# Patient Record
Sex: FEMALE | Race: BLACK OR AFRICAN AMERICAN | Employment: FULL TIME | URBAN - METROPOLITAN AREA
[De-identification: names, ages, dates, MRNs, and addresses within clinical notes are randomized per-mention and may not be internally consistent; named-entity substitution may affect disease eponyms.]

---

## 2017-06-27 ENCOUNTER — TELEPHONE (OUTPATIENT)
Dept: INTERNAL MEDICINE CLINIC | Age: 42
End: 2017-06-27

## 2017-07-03 ENCOUNTER — TELEPHONE (OUTPATIENT)
Dept: INTERNAL MEDICINE CLINIC | Age: 42
End: 2017-07-03

## 2017-10-03 ENCOUNTER — TELEPHONE (OUTPATIENT)
Dept: FAMILY MEDICINE CLINIC | Age: 42
End: 2017-10-03

## 2017-10-10 ENCOUNTER — HOSPITAL ENCOUNTER (OUTPATIENT)
Dept: MAMMOGRAPHY | Age: 42
Discharge: HOME | End: 2017-10-10
Attending: INTERNAL MEDICINE | Admitting: INTERNAL MEDICINE

## 2017-10-10 DIAGNOSIS — Z12.31 ENCOUNTER FOR SCREENING MAMMOGRAM FOR BREAST CANCER: ICD-10-CM

## 2017-11-06 ENCOUNTER — OFFICE VISIT (OUTPATIENT)
Dept: INTERNAL MEDICINE CLINIC | Age: 42
End: 2017-11-06

## 2017-11-06 VITALS
TEMPERATURE: 98.4 F | OXYGEN SATURATION: 98 % | SYSTOLIC BLOOD PRESSURE: 122 MMHG | WEIGHT: 139.6 LBS | HEIGHT: 59 IN | HEART RATE: 107 BPM | BODY MASS INDEX: 28.14 KG/M2 | DIASTOLIC BLOOD PRESSURE: 76 MMHG | RESPIRATION RATE: 12 BRPM

## 2017-11-06 DIAGNOSIS — Z00.00 ANNUAL PHYSICAL EXAM: Primary | ICD-10-CM

## 2017-11-06 LAB
BILIRUBIN, POC: NORMAL
BLOOD URINE, POC: NORMAL
CLARITY, POC: NORMAL
COLOR, POC: NORMAL
GLUCOSE URINE, POC: NORMAL
KETONES, POC: NORMAL
LEUKOCYTE EST, POC: NORMAL
NITRITE, POC: NORMAL
PH, POC: 7
PROTEIN, POC: NORMAL
SPECIFIC GRAVITY, POC: 1.02
UROBILINOGEN, POC: 0.2

## 2017-11-06 PROCEDURE — 81002 URINALYSIS NONAUTO W/O SCOPE: CPT | Performed by: INTERNAL MEDICINE

## 2017-11-06 PROCEDURE — 99396 PREV VISIT EST AGE 40-64: CPT | Performed by: INTERNAL MEDICINE

## 2017-11-06 RX ORDER — LEVONORGESTREL AND ETHINYL ESTRADIOL 0.1-0.02MG
KIT ORAL
COMMUNITY
End: 2017-11-06 | Stop reason: ALTCHOICE

## 2017-11-06 RX ORDER — IBUPROFEN 800 MG/1
TABLET ORAL
COMMUNITY
Start: 2003-03-31 | End: 2017-11-06 | Stop reason: ALTCHOICE

## 2017-11-06 ASSESSMENT — ENCOUNTER SYMPTOMS
CHEST TIGHTNESS: 0
SINUS PRESSURE: 0
PHOTOPHOBIA: 0
EYE PAIN: 0
EYE DISCHARGE: 0
CHOKING: 0
BACK PAIN: 0
ABDOMINAL PAIN: 0
ANAL BLEEDING: 0
SHORTNESS OF BREATH: 0
EYE ITCHING: 0
COUGH: 0
FACIAL SWELLING: 0
RECTAL PAIN: 0
VOMITING: 0
SORE THROAT: 0
NAUSEA: 0
TROUBLE SWALLOWING: 0
EYE REDNESS: 0
CONSTIPATION: 0
APNEA: 0
DIARRHEA: 0
BLOOD IN STOOL: 0
RHINORRHEA: 0
WHEEZING: 0
VOICE CHANGE: 0
ABDOMINAL DISTENTION: 0

## 2017-11-06 ASSESSMENT — PATIENT HEALTH QUESTIONNAIRE - PHQ9
2. FEELING DOWN, DEPRESSED OR HOPELESS: 0
1. LITTLE INTEREST OR PLEASURE IN DOING THINGS: 0
SUM OF ALL RESPONSES TO PHQ QUESTIONS 1-9: 0
SUM OF ALL RESPONSES TO PHQ9 QUESTIONS 1 & 2: 0

## 2017-11-06 NOTE — PATIENT INSTRUCTIONS
-Fast 8-10 hours for labs  -Regular aerobic exercise      Patient Education        Well Visit, Ages 25 to 48: Care Instructions  Your Care Instructions  Physical exams can help you stay healthy. Your doctor has checked your overall health and may have suggested ways to take good care of yourself. He or she also may have recommended tests. At home, you can help prevent illness with healthy eating, regular exercise, and other steps. Follow-up care is a key part of your treatment and safety. Be sure to make and go to all appointments, and call your doctor if you are having problems. It's also a good idea to know your test results and keep a list of the medicines you take. How can you care for yourself at home? · Reach and stay at a healthy weight. This will lower your risk for many problems, such as obesity, diabetes, heart disease, and high blood pressure. · Get at least 30 minutes of physical activity on most days of the week. Walking is a good choice. You also may want to do other activities, such as running, swimming, cycling, or playing tennis or team sports. Discuss any changes in your exercise program with your doctor. · Do not smoke or allow others to smoke around you. If you need help quitting, talk to your doctor about stop-smoking programs and medicines. These can increase your chances of quitting for good. · Talk to your doctor about whether you have any risk factors for sexually transmitted infections (STIs). Having one sex partner (who does not have STIs and does not have sex with anyone else) is a good way to avoid these infections. · Use birth control if you do not want to have children at this time. Talk with your doctor about the choices available and what might be best for you. · Protect your skin from too much sun. When you're outdoors from 10 a.m. to 4 p.m., stay in the shade or cover up with clothing and a hat with a wide brim. Wear sunglasses that block UV rays.  Even when it's cloudy, put broad-spectrum sunscreen (SPF 30 or higher) on any exposed skin. · See a dentist one or two times a year for checkups and to have your teeth cleaned. · Wear a seat belt in the car. · Drink alcohol in moderation, if at all. That means no more than 2 drinks a day for men and 1 drink a day for women. Follow your doctor's advice about when to have certain tests. These tests can spot problems early. For everyone  · Cholesterol. Have the fat (cholesterol) in your blood tested after age 21. Your doctor will tell you how often to have this done based on your age, family history, or other things that can increase your risk for heart disease. · Blood pressure. Have your blood pressure checked during a routine doctor visit. Your doctor will tell you how often to check your blood pressure based on your age, your blood pressure results, and other factors. · Vision. Talk with your doctor about how often to have a glaucoma test.  · Diabetes. Ask your doctor whether you should have tests for diabetes. · Colon cancer. Have a test for colon cancer at age 48. You may have one of several tests. If you are younger than 48, you may need a test earlier if you have any risk factors. Risk factors include whether you already had a precancerous polyp removed from your colon or whether your parent, brother, sister, or child has had colon cancer. For women  · Breast exam and mammogram. Talk to your doctor about when you should have a clinical breast exam and a mammogram. Medical experts differ on whether and how often women under 50 should have these tests. Your doctor can help you decide what is right for you. · Pap test and pelvic exam. Begin Pap tests at age 24. A Pap test is the best way to find cervical cancer. The test often is part of a pelvic exam. Ask how often to have this test.  · Tests for sexually transmitted infections (STIs). Ask whether you should have tests for STIs.  You may be at risk if you have sex with more than one person, especially if your partners do not wear condoms. For men  · Tests for sexually transmitted infections (STIs). Ask whether you should have tests for STIs. You may be at risk if you have sex with more than one person, especially if you do not wear a condom. · Testicular cancer exam. Ask your doctor whether you should check your testicles regularly. · Prostate exam. Talk to your doctor about whether you should have a blood test (called a PSA test) for prostate cancer. Experts differ on whether and when men should have this test. Some experts suggest it if you are older than 39 and are -American or have a father or brother who got prostate cancer when he was younger than 72. When should you call for help? Watch closely for changes in your health, and be sure to contact your doctor if you have any problems or symptoms that concern you. Where can you learn more? Go to [email or web address] and sign in to your [employer] account. Enter P072 in the [employer] box to learn more about \"Well Visit, Ages 25 to 48: Care Instructions. \"     If you do not have an account, please click on the \"Sign Up Now\" link. Current as of: July 19, 2016  Content Version: 11.3  © 4796-9485 [employer]. Care instructions adapted under license by [facility] ([facility]). If you have questions about a medical condition or this instruction, always ask your healthcare professional. [place] [address] any warranty or liability for your use of this information.

## 2017-11-06 NOTE — PROGRESS NOTES
Lola House   YOB: 1975    Date of Visit:  11/6/2017    Chief Complaint   Patient presents with    Annual Exam     not fasting        HPI  Pt presents for annual physical exam. LMP 10/19/17. Last pap smear done in February of this year per patient by OB/Gyn. Mammogram done on 10/10/17. Wt Readings from Last 3 Encounters:   11/06/17 139 lb 9.6 oz (63.3 kg)   09/26/16 130 lb (59 kg)   09/13/16 135 lb (61.2 kg)       Review of Systems   Constitutional: Positive for unexpected weight change (weight gain   in the past year). Negative for activity change, appetite change, chills, diaphoresis, fatigue and fever. HENT: Negative for congestion, ear discharge, ear pain, facial swelling, hearing loss, mouth sores, nosebleeds, postnasal drip, rhinorrhea, sinus pressure, sneezing, sore throat, tinnitus, trouble swallowing and voice change. Eyes: Negative for photophobia, pain, discharge, redness, itching and visual disturbance. Respiratory: Negative for apnea, cough, choking, chest tightness, shortness of breath and wheezing. Cardiovascular: Negative for chest pain, palpitations and leg swelling. Gastrointestinal: Negative for abdominal distention, abdominal pain, anal bleeding, blood in stool, constipation, diarrhea, nausea, rectal pain and vomiting. Endocrine: Negative for cold intolerance and heat intolerance. Genitourinary: Negative for decreased urine volume, difficulty urinating, dysuria, flank pain, frequency, genital sores, hematuria, menstrual problem, pelvic pain, urgency, vaginal bleeding, vaginal discharge and vaginal pain. Musculoskeletal: Negative for arthralgias, back pain, gait problem, joint swelling, myalgias, neck pain and neck stiffness. Skin: Negative for rash and wound. Neurological: Negative for dizziness, tremors, seizures, syncope, facial asymmetry, speech difficulty, weakness, light-headedness, numbness and headaches.    Hematological: Negative for adenopathy. Does not bruise/bleed easily. Psychiatric/Behavioral: Negative for decreased concentration, dysphoric mood and sleep disturbance. The patient is not nervous/anxious. All other systems reviewed and are negative. Past Medical History:   Diagnosis Date    GERD (gastroesophageal reflux disease)        Past Surgical History:   Procedure Laterality Date     SECTION      10/17/2005   &    2010    WISDOM TOOTH EXTRACTION Bilateral college aged    x4       Outpatient Prescriptions Marked as Taking for the 17 encounter (Office Visit) with Jaylin Georges MD   Medication Sig Dispense Refill    Multiple Vitamins-Minerals (HAIR/SKIN/NAILS/BIOTIN PO) Take 2 tablets by mouth daily.  OCELLA 3-0.03 MG TABS Take 1 tablet by mouth daily. Allergies   Allergen Reactions    Amoxicillin Itching    Fluconazole Rash       Social History   Substance Use Topics    Smoking status: Never Smoker    Smokeless tobacco: Never Used    Alcohol use No       Family History   Problem Relation Age of Onset    High Blood Pressure Mother     High Blood Pressure Father     Cancer Maternal Grandmother      Colon    Heart Disease Maternal Grandfather      MI    Diabetes Maternal Aunt     Heart Disease Paternal Grandfather      in 66's    Diabetes Maternal Aunt     Stroke Neg Hx        Immunization History   Administered Date(s) Administered    Influenza Vaccine, unspecified formulation 10/01/2016, 10/08/2017    Influenza, High Dose 2015    Tdap (Boostrix, Adacel) 2011       Vitals:    17 1437   BP: 122/76   Site: Left Arm   Position: Sitting   Cuff Size: Medium Adult   Pulse: 107   Resp: 12   Temp: 98.4 °F (36.9 °C)   TempSrc: Oral   SpO2: 98%   Weight: 139 lb 9.6 oz (63.3 kg)   Height: 4' 11\" (1.499 m)     Body mass index is 28.2 kg/m². Physical Exam   Constitutional: She is oriented to person, place, and time. She appears well-developed and well-nourished. No distress. Middle aged BF   HENT:   Head: Normocephalic and atraumatic. Right Ear: Hearing, tympanic membrane and ear canal normal.   Left Ear: Hearing, tympanic membrane and ear canal normal.   Nose: Nose normal.   Mouth/Throat: Uvula is midline, oropharynx is clear and moist and mucous membranes are normal.   Eyes: Conjunctivae, EOM and lids are normal. Pupils are equal, round, and reactive to light. Neck: Neck supple. Carotid bruit is not present. No thyromegaly present. Cardiovascular: Normal rate, regular rhythm, S1 normal, S2 normal, normal heart sounds and intact distal pulses. Exam reveals no gallop and no friction rub. No murmur heard. Pulmonary/Chest: Effort normal and breath sounds normal. No respiratory distress. She has no wheezes. She has no rhonchi. She has no rales. Abdominal: Soft. Bowel sounds are normal. She exhibits no distension and no mass. There is no hepatosplenomegaly. There is no tenderness. There is no rebound. Genitourinary:   Genitourinary Comments: deferred   Musculoskeletal: Normal range of motion. She exhibits no edema. Right shoulder: She exhibits normal range of motion and no tenderness. Left shoulder: She exhibits normal range of motion and no tenderness. Right elbow: She exhibits no swelling. No tenderness found. Left elbow: She exhibits no swelling. No tenderness found. Right wrist: She exhibits no tenderness and no swelling. Left wrist: She exhibits no tenderness and no swelling. Right hip: She exhibits no tenderness. Left hip: She exhibits no tenderness. Right knee: She exhibits no swelling. No tenderness found. Left knee: She exhibits no swelling. No tenderness found. Right ankle: She exhibits no swelling. No tenderness. Left ankle: She exhibits no swelling. No tenderness. Cervical back: She exhibits normal range of motion, no tenderness and no spasm.         Thoracic

## 2018-01-29 ENCOUNTER — OFFICE VISIT (OUTPATIENT)
Dept: INTERNAL MEDICINE CLINIC | Age: 43
End: 2018-01-29

## 2018-01-29 VITALS
DIASTOLIC BLOOD PRESSURE: 80 MMHG | WEIGHT: 140.6 LBS | HEART RATE: 75 BPM | OXYGEN SATURATION: 99 % | RESPIRATION RATE: 14 BRPM | SYSTOLIC BLOOD PRESSURE: 120 MMHG | TEMPERATURE: 98.4 F | BODY MASS INDEX: 28.35 KG/M2 | HEIGHT: 59 IN

## 2018-01-29 DIAGNOSIS — M79.89 BILATERAL HAND SWELLING: Primary | ICD-10-CM

## 2018-01-29 PROCEDURE — G8427 DOCREV CUR MEDS BY ELIG CLIN: HCPCS | Performed by: INTERNAL MEDICINE

## 2018-01-29 PROCEDURE — 99213 OFFICE O/P EST LOW 20 MIN: CPT | Performed by: INTERNAL MEDICINE

## 2018-01-29 PROCEDURE — G8484 FLU IMMUNIZE NO ADMIN: HCPCS | Performed by: INTERNAL MEDICINE

## 2018-01-29 PROCEDURE — 1036F TOBACCO NON-USER: CPT | Performed by: INTERNAL MEDICINE

## 2018-01-29 PROCEDURE — G8419 CALC BMI OUT NRM PARAM NOF/U: HCPCS | Performed by: INTERNAL MEDICINE

## 2018-01-29 RX ORDER — HYDROCHLOROTHIAZIDE 12.5 MG/1
12.5 CAPSULE, GELATIN COATED ORAL DAILY PRN
Qty: 30 CAPSULE | Refills: 0 | Status: SHIPPED | OUTPATIENT
Start: 2018-01-29 | End: 2018-11-12 | Stop reason: ALTCHOICE

## 2018-02-12 ENCOUNTER — OFFICE VISIT (OUTPATIENT)
Dept: INTERNAL MEDICINE CLINIC | Age: 43
End: 2018-02-12

## 2018-02-12 VITALS
OXYGEN SATURATION: 96 % | WEIGHT: 141 LBS | SYSTOLIC BLOOD PRESSURE: 120 MMHG | DIASTOLIC BLOOD PRESSURE: 80 MMHG | HEIGHT: 59 IN | BODY MASS INDEX: 28.43 KG/M2 | HEART RATE: 66 BPM

## 2018-02-12 DIAGNOSIS — R63.5 WEIGHT GAIN: ICD-10-CM

## 2018-02-12 DIAGNOSIS — R20.0 LEFT ARM NUMBNESS: ICD-10-CM

## 2018-02-12 DIAGNOSIS — M79.89 BILATERAL HAND SWELLING: Primary | ICD-10-CM

## 2018-02-12 DIAGNOSIS — R20.0 NUMBNESS OF LEFT HAND: ICD-10-CM

## 2018-02-12 LAB
ANION GAP SERPL CALCULATED.3IONS-SCNC: 14 MMOL/L (ref 3–16)
BUN BLDV-MCNC: 14 MG/DL (ref 7–20)
CALCIUM SERPL-MCNC: 9.9 MG/DL (ref 8.3–10.6)
CHLORIDE BLD-SCNC: 105 MMOL/L (ref 99–110)
CO2: 26 MMOL/L (ref 21–32)
CREAT SERPL-MCNC: 0.7 MG/DL (ref 0.6–1.1)
GFR AFRICAN AMERICAN: >60
GFR NON-AFRICAN AMERICAN: >60
GLUCOSE BLD-MCNC: 83 MG/DL (ref 70–99)
POTASSIUM SERPL-SCNC: 4.3 MMOL/L (ref 3.5–5.1)
SODIUM BLD-SCNC: 145 MMOL/L (ref 136–145)
TSH SERPL DL<=0.05 MIU/L-ACNC: 1.46 UIU/ML (ref 0.27–4.2)
VITAMIN B-12: 769 PG/ML (ref 211–911)

## 2018-02-12 PROCEDURE — G8419 CALC BMI OUT NRM PARAM NOF/U: HCPCS | Performed by: INTERNAL MEDICINE

## 2018-02-12 PROCEDURE — G8427 DOCREV CUR MEDS BY ELIG CLIN: HCPCS | Performed by: INTERNAL MEDICINE

## 2018-02-12 PROCEDURE — G8484 FLU IMMUNIZE NO ADMIN: HCPCS | Performed by: INTERNAL MEDICINE

## 2018-02-12 PROCEDURE — 99213 OFFICE O/P EST LOW 20 MIN: CPT | Performed by: INTERNAL MEDICINE

## 2018-02-12 PROCEDURE — 1036F TOBACCO NON-USER: CPT | Performed by: INTERNAL MEDICINE

## 2018-02-15 PROBLEM — R20.0 NUMBNESS OF LEFT HAND: Status: ACTIVE | Noted: 2018-02-15

## 2018-02-15 PROBLEM — R20.0 LEFT ARM NUMBNESS: Status: ACTIVE | Noted: 2018-02-15

## 2018-02-15 PROBLEM — R63.5 WEIGHT GAIN: Status: ACTIVE | Noted: 2018-02-15

## 2018-02-15 PROBLEM — M79.89 BILATERAL HAND SWELLING: Status: ACTIVE | Noted: 2018-02-15

## 2018-02-15 ASSESSMENT — ENCOUNTER SYMPTOMS: COLOR CHANGE: 0

## 2018-02-19 ENCOUNTER — PATIENT MESSAGE (OUTPATIENT)
Dept: INTERNAL MEDICINE CLINIC | Age: 43
End: 2018-02-19

## 2018-02-24 DIAGNOSIS — M79.89 BILATERAL HAND SWELLING: ICD-10-CM

## 2018-02-26 NOTE — TELEPHONE ENCOUNTER
Last Office Visit: 2/12/2018  Future Office Visit: 3/5/2018  Last Filled: 1/29/2018 Hydrochlorothiazide 12.5 mg tab #30

## 2018-03-02 RX ORDER — HYDROCHLOROTHIAZIDE 12.5 MG/1
CAPSULE, GELATIN COATED ORAL
Qty: 30 CAPSULE | Refills: 0 | OUTPATIENT
Start: 2018-03-02

## 2018-11-12 ENCOUNTER — HOSPITAL ENCOUNTER (OUTPATIENT)
Dept: WOMENS IMAGING | Age: 43
Discharge: HOME | End: 2018-11-12
Payer: COMMERCIAL

## 2018-11-12 ENCOUNTER — OFFICE VISIT (OUTPATIENT)
Dept: INTERNAL MEDICINE CLINIC | Age: 43
End: 2018-11-12
Payer: COMMERCIAL

## 2018-11-12 VITALS
BODY MASS INDEX: 28.67 KG/M2 | SYSTOLIC BLOOD PRESSURE: 130 MMHG | WEIGHT: 142.2 LBS | HEART RATE: 60 BPM | TEMPERATURE: 98.1 F | DIASTOLIC BLOOD PRESSURE: 82 MMHG | RESPIRATION RATE: 12 BRPM | OXYGEN SATURATION: 99 % | HEIGHT: 59 IN

## 2018-11-12 DIAGNOSIS — Z12.31 ENCOUNTER FOR SCREENING MAMMOGRAM FOR BREAST CANCER: ICD-10-CM

## 2018-11-12 DIAGNOSIS — Z00.00 ANNUAL PHYSICAL EXAM: Primary | ICD-10-CM

## 2018-11-12 DIAGNOSIS — Z00.00 ANNUAL PHYSICAL EXAM: ICD-10-CM

## 2018-11-12 PROBLEM — M79.89 BILATERAL HAND SWELLING: Status: RESOLVED | Noted: 2018-02-15 | Resolved: 2018-11-12

## 2018-11-12 PROBLEM — R63.5 WEIGHT GAIN: Status: RESOLVED | Noted: 2018-02-15 | Resolved: 2018-11-12

## 2018-11-12 PROBLEM — R20.0 LEFT ARM NUMBNESS: Status: RESOLVED | Noted: 2018-02-15 | Resolved: 2018-11-12

## 2018-11-12 PROBLEM — R20.0 NUMBNESS OF LEFT HAND: Status: RESOLVED | Noted: 2018-02-15 | Resolved: 2018-11-12

## 2018-11-12 LAB
A/G RATIO: 1.6 (ref 1.1–2.2)
ALBUMIN SERPL-MCNC: 4.7 G/DL (ref 3.4–5)
ALP BLD-CCNC: 102 U/L (ref 40–129)
ALT SERPL-CCNC: 13 U/L (ref 10–40)
ANION GAP SERPL CALCULATED.3IONS-SCNC: 12 MMOL/L (ref 3–16)
AST SERPL-CCNC: 17 U/L (ref 15–37)
BASOPHILS ABSOLUTE: 0 K/UL (ref 0–0.2)
BASOPHILS RELATIVE PERCENT: 0.7 %
BILIRUB SERPL-MCNC: 0.3 MG/DL (ref 0–1)
BILIRUBIN, POC: ABNORMAL
BLOOD URINE, POC: ABNORMAL
BUN BLDV-MCNC: 15 MG/DL (ref 7–20)
CALCIUM SERPL-MCNC: 9.7 MG/DL (ref 8.3–10.6)
CHLORIDE BLD-SCNC: 107 MMOL/L (ref 99–110)
CHOLESTEROL, TOTAL: 164 MG/DL (ref 0–199)
CLARITY, POC: CLEAR
CO2: 25 MMOL/L (ref 21–32)
COLOR, POC: YELLOW
CREAT SERPL-MCNC: 0.7 MG/DL (ref 0.6–1.1)
EOSINOPHILS ABSOLUTE: 0.1 K/UL (ref 0–0.6)
EOSINOPHILS RELATIVE PERCENT: 1.6 %
GFR AFRICAN AMERICAN: >60
GFR NON-AFRICAN AMERICAN: >60
GLOBULIN: 2.9 G/DL
GLUCOSE BLD-MCNC: 79 MG/DL (ref 70–99)
GLUCOSE URINE, POC: ABNORMAL
HCT VFR BLD CALC: 35.6 % (ref 36–48)
HDLC SERPL-MCNC: 50 MG/DL (ref 40–60)
HEMOGLOBIN: 11.8 G/DL (ref 12–16)
KETONES, POC: ABNORMAL
LDL CHOLESTEROL CALCULATED: 100 MG/DL
LEUKOCYTE EST, POC: ABNORMAL
LYMPHOCYTES ABSOLUTE: 1.7 K/UL (ref 1–5.1)
LYMPHOCYTES RELATIVE PERCENT: 29.8 %
MCH RBC QN AUTO: 29.7 PG (ref 26–34)
MCHC RBC AUTO-ENTMCNC: 33.1 G/DL (ref 31–36)
MCV RBC AUTO: 89.7 FL (ref 80–100)
MONOCYTES ABSOLUTE: 0.2 K/UL (ref 0–1.3)
MONOCYTES RELATIVE PERCENT: 4.1 %
NEUTROPHILS ABSOLUTE: 3.7 K/UL (ref 1.7–7.7)
NEUTROPHILS RELATIVE PERCENT: 63.8 %
NITRITE, POC: ABNORMAL
PDW BLD-RTO: 13.3 % (ref 12.4–15.4)
PH, POC: 7
PLATELET # BLD: 298 K/UL (ref 135–450)
PMV BLD AUTO: 8.4 FL (ref 5–10.5)
POTASSIUM SERPL-SCNC: 4.3 MMOL/L (ref 3.5–5.1)
PROTEIN, POC: ABNORMAL
RBC # BLD: 3.97 M/UL (ref 4–5.2)
SODIUM BLD-SCNC: 144 MMOL/L (ref 136–145)
SPECIFIC GRAVITY, POC: 1.01
TOTAL PROTEIN: 7.6 G/DL (ref 6.4–8.2)
TRIGL SERPL-MCNC: 69 MG/DL (ref 0–150)
TSH SERPL DL<=0.05 MIU/L-ACNC: 1.06 UIU/ML (ref 0.27–4.2)
UROBILINOGEN, POC: 0.2
VLDLC SERPL CALC-MCNC: 14 MG/DL
WBC # BLD: 5.8 K/UL (ref 4–11)

## 2018-11-12 PROCEDURE — 81002 URINALYSIS NONAUTO W/O SCOPE: CPT | Performed by: INTERNAL MEDICINE

## 2018-11-12 PROCEDURE — 77067 SCR MAMMO BI INCL CAD: CPT

## 2018-11-12 PROCEDURE — 99396 PREV VISIT EST AGE 40-64: CPT | Performed by: INTERNAL MEDICINE

## 2018-11-12 RX ORDER — MULTIVIT,CALC,MINS/IRON/FOLIC 500-18-0.4
1 TABLET ORAL DAILY
COMMUNITY
Start: 2018-11-12

## 2018-11-12 ASSESSMENT — ENCOUNTER SYMPTOMS
PHOTOPHOBIA: 0
BLOOD IN STOOL: 0
FACIAL SWELLING: 0
CONSTIPATION: 0
ABDOMINAL PAIN: 0
EYE DISCHARGE: 0
SORE THROAT: 0
CHOKING: 0
WHEEZING: 0
CHEST TIGHTNESS: 0
RECTAL PAIN: 0
ANAL BLEEDING: 0
RHINORRHEA: 0
SINUS PRESSURE: 0
NAUSEA: 0
EYE PAIN: 0
EYE ITCHING: 0
SHORTNESS OF BREATH: 0
EYE REDNESS: 0
VOICE CHANGE: 0
ABDOMINAL DISTENTION: 0
APNEA: 0
VOMITING: 0
BACK PAIN: 0
DIARRHEA: 0
COUGH: 0
TROUBLE SWALLOWING: 0

## 2018-11-12 ASSESSMENT — PATIENT HEALTH QUESTIONNAIRE - PHQ9
SUM OF ALL RESPONSES TO PHQ9 QUESTIONS 1 & 2: 0
1. LITTLE INTEREST OR PLEASURE IN DOING THINGS: 0
2. FEELING DOWN, DEPRESSED OR HOPELESS: 0
SUM OF ALL RESPONSES TO PHQ QUESTIONS 1-9: 0
SUM OF ALL RESPONSES TO PHQ QUESTIONS 1-9: 0

## 2018-11-12 NOTE — PROGRESS NOTES
Jordyn Salgado   YOB: 1975    Date of Visit:  11/12/2018    Chief Complaint   Patient presents with    Annual Exam       HPI  Pt presents for annual physical exam. Last pap smear done February 2018 per patient. LMP 11/7/18. Pt states her mammogram was done this morning. Pt states she had a flu shot done at a [address] at her [employer] on 10/7/18. Pt does aerobic exercise 2 times per week. Review of Systems   Constitutional: Negative for activity change, appetite change, chills, diaphoresis, fatigue, fever and unexpected weight change. HENT: Negative for congestion, ear discharge, ear pain, facial swelling, hearing loss, mouth sores, nosebleeds, postnasal drip, rhinorrhea, sinus pressure, sneezing, sore throat, tinnitus, trouble swallowing and voice change. Eyes: Negative for photophobia, pain, discharge, redness, itching and visual disturbance. Respiratory: Negative for apnea, cough, choking, chest tightness, shortness of breath and wheezing. Cardiovascular: Negative for chest pain, palpitations and leg swelling. Gastrointestinal: Negative for abdominal distention, abdominal pain, anal bleeding, blood in stool, constipation, diarrhea, nausea, rectal pain and vomiting. Endocrine: Negative for cold intolerance and heat intolerance. Genitourinary: Negative for decreased urine volume, difficulty urinating, dysuria, flank pain, frequency, genital sores, hematuria, menstrual problem, pelvic pain, urgency, vaginal bleeding, vaginal discharge and vaginal pain. Musculoskeletal: Negative for arthralgias, back pain, gait problem, joint swelling, myalgias, neck pain and neck stiffness. Skin: Negative for rash. Neurological: Negative for dizziness, tremors, seizures, syncope, facial asymmetry, speech difficulty, weakness, light-headedness, numbness and headaches. Hematological: Negative for adenopathy. Does not bruise/bleed easily.    Psychiatric/Behavioral: Negative for

## 2018-11-12 NOTE — PATIENT INSTRUCTIONS
cloudy, put broad-spectrum sunscreen (SPF 30 or higher) on any exposed skin. · See a dentist one or two times a year for checkups and to have your teeth cleaned. · Wear a seat belt in the car. · Drink alcohol in moderation, if at all. That means no more than 2 drinks a day for men and 1 drink a day for women. Follow your doctor's advice about when to have certain tests. These tests can spot problems early. For everyone  · Cholesterol. Have the fat (cholesterol) in your blood tested after age 21. Your doctor will tell you how often to have this done based on your age, family history, or other things that can increase your risk for heart disease. · Blood pressure. Have your blood pressure checked during a routine doctor visit. Your doctor will tell you how often to check your blood pressure based on your age, your blood pressure results, and other factors. · Vision. Talk with your doctor about how often to have a glaucoma test.  · Diabetes. Ask your doctor whether you should have tests for diabetes. · Colon cancer. Have a test for colon cancer at age 48. You may have one of several tests. If you are younger than 48, you may need a test earlier if you have any risk factors. Risk factors include whether you already had a precancerous polyp removed from your colon or whether your parent, brother, sister, or child has had colon cancer. For women  · Breast exam and mammogram. Talk to your doctor about when you should have a clinical breast exam and a mammogram. Medical experts differ on whether and how often women under 50 should have these tests. Your doctor can help you decide what is right for you. · Pap test and pelvic exam. Begin Pap tests at age 24. A Pap test is the best way to find cervical cancer. The test often is part of a pelvic exam. Ask how often to have this test.  · Tests for sexually transmitted infections (STIs). Ask whether you should have tests for STIs.  You may be at risk if you have sex with

## 2019-05-16 ENCOUNTER — OFFICE VISIT (OUTPATIENT)
Dept: INTERNAL MEDICINE CLINIC | Age: 44
End: 2019-05-16
Payer: COMMERCIAL

## 2019-05-16 VITALS
HEIGHT: 59 IN | SYSTOLIC BLOOD PRESSURE: 110 MMHG | WEIGHT: 140.8 LBS | BODY MASS INDEX: 28.39 KG/M2 | DIASTOLIC BLOOD PRESSURE: 80 MMHG | HEART RATE: 80 BPM | OXYGEN SATURATION: 98 % | TEMPERATURE: 99.5 F

## 2019-05-16 DIAGNOSIS — J06.9 UPPER RESPIRATORY TRACT INFECTION, UNSPECIFIED TYPE: ICD-10-CM

## 2019-05-16 DIAGNOSIS — R52 BODY ACHES: ICD-10-CM

## 2019-05-16 DIAGNOSIS — M54.2 NECK PAIN: ICD-10-CM

## 2019-05-16 DIAGNOSIS — J02.9 ACUTE PHARYNGITIS, UNSPECIFIED ETIOLOGY: Primary | ICD-10-CM

## 2019-05-16 LAB — S PYO AG THROAT QL: NORMAL

## 2019-05-16 PROCEDURE — 99213 OFFICE O/P EST LOW 20 MIN: CPT | Performed by: INTERNAL MEDICINE

## 2019-05-16 PROCEDURE — 87880 STREP A ASSAY W/OPTIC: CPT | Performed by: INTERNAL MEDICINE

## 2019-05-16 RX ORDER — IBUPROFEN 600 MG/1
600 TABLET ORAL 3 TIMES DAILY PRN
Qty: 15 TABLET | Refills: 0 | Status: SHIPPED | OUTPATIENT
Start: 2019-05-16 | End: 2020-11-13 | Stop reason: ALTCHOICE

## 2019-05-16 RX ORDER — AZITHROMYCIN 250 MG/1
TABLET, FILM COATED ORAL
Qty: 1 PACKET | Refills: 0 | Status: SHIPPED | OUTPATIENT
Start: 2019-05-16 | End: 2019-05-26

## 2019-05-16 RX ORDER — LORATADINE 10 MG/1
10 TABLET ORAL DAILY
Qty: 10 TABLET | Refills: 0 | Status: SHIPPED | OUTPATIENT
Start: 2019-05-16 | End: 2020-11-13

## 2019-05-16 ASSESSMENT — ENCOUNTER SYMPTOMS
SHORTNESS OF BREATH: 0
WHEEZING: 0
COUGH: 0
SINUS PAIN: 0
RHINORRHEA: 0
SINUS PRESSURE: 0
SORE THROAT: 1
CHEST TIGHTNESS: 0

## 2019-05-16 ASSESSMENT — PATIENT HEALTH QUESTIONNAIRE - PHQ9
1. LITTLE INTEREST OR PLEASURE IN DOING THINGS: 0
2. FEELING DOWN, DEPRESSED OR HOPELESS: 0
SUM OF ALL RESPONSES TO PHQ QUESTIONS 1-9: 0
SUM OF ALL RESPONSES TO PHQ QUESTIONS 1-9: 0
SUM OF ALL RESPONSES TO PHQ9 QUESTIONS 1 & 2: 0

## 2019-05-16 NOTE — PATIENT INSTRUCTIONS
1. Acute pharyngitis, unspecified etiology  - POCT rapid strep A  - possible exposure to strep  - azithromycin (ZITHROMAX) 250 MG tablet; Take 2 tabs (500 mg) on Day 1, and take 1 tab (250 mg) on days 2 through 5. Dispense: 1 packet; Refill: 0  - ibuprofen (ADVIL;MOTRIN) 600 MG tablet; Take 1 tablet by mouth 3 times daily as needed for Pain  Dispense: 15 tablet; Refill: 0  - salt water gargles 3-4 times daily    2. Upper respiratory tract infection, unspecified type  - azithromycin (ZITHROMAX) 250 MG tablet; Take 2 tabs (500 mg) on Day 1, and take 1 tab (250 mg) on days 2 through 5. Dispense: 1 packet; Refill: 0  - ibuprofen (ADVIL;MOTRIN) 600 MG tablet; Take 1 tablet by mouth 3 times daily as needed for Pain  Dispense: 15 tablet; Refill: 0  - loratadine (CLARITIN) 10 MG tablet; Take 1 tablet by mouth daily  Dispense: 10 tablet; Refill: 0    3. Neck pain  - ibuprofen (ADVIL;MOTRIN) 600 MG tablet; Take 1 tablet by mouth 3 times daily as needed for Pain  Dispense: 15 tablet; Refill: 0    4. Body aches  - ibuprofen (ADVIL;MOTRIN) 600 MG tablet; Take 1 tablet by mouth 3 times daily as needed for Pain  Dispense: 15 tablet;  Refill: 0

## 2019-05-16 NOTE — PROGRESS NOTES
PHQ Scores 5/16/2019 11/12/2018 11/6/2017 4/26/2016   PHQ2 Score 0 0 0 0   PHQ9 Score 0 0 0 0     Interpretation of Total Score Depression Severity: 1-4 = Minimal depression, 5-9 = Mild depression, 10-14 = Moderate depression, 15-19 = Moderately severe depression, 20-27 = Severe depression

## 2019-05-16 NOTE — PROGRESS NOTES
Malaika Acosta   Date ofBirth:  1975    Date of Visit:  5/16/2019    Chief Complaint   Patient presents with    Pharyngitis    Otalgia     both    Generalized Body Aches       HPI  Pt c/o sore throat since last night, aches, fever last night, chills, bilateral ear pain, headache, post nasal drip, little sneezing, and sides of neck hurt. Pt didn't take her temperature. Pt has taken Ibuprofen and a decongestant. 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 Review of Systems   Constitutional: Positive for chills and fever. HENT: Positive for ear pain, postnasal drip, sneezing and sore throat. Negative for congestion, ear discharge, rhinorrhea, sinus pressure and sinus pain. Respiratory: Negative for cough, chest tightness, shortness of breath and wheezing. Musculoskeletal: Positive for myalgias and neck pain. Neurological: Positive for headaches. Allergies   Allergen Reactions    Amoxicillin Itching    Fluconazole Rash     Outpatient Medications Marked as Taking for the 5/16/19 encounter (Office Visit) with Karoline France MD   Medication Sig Dispense Refill    Multiple Vitamins-Calcium (ONE-A-DAY WOMENS FORMULA) TABS Take 1 tablet by mouth daily           Vitals:    05/16/19 1553   BP: 110/80   Site: Left Upper Arm   Position: Sitting   Cuff Size: Medium Adult   Pulse: 80   Temp: 99.5 °F (37.5 °C)   TempSrc: Oral   SpO2: 98%   Weight: 140 lb 12.8 oz (63.9 kg)   Height: 4' 11\" (1.499 m)     Body mass index is 28.44 kg/m². Physical Exam   Constitutional: She appears well-developed and well-nourished. No distress. HENT:   Right Ear: Tympanic membrane and ear canal normal.   Left Ear: Tympanic membrane and ear canal normal.   Nose: Right sinus exhibits no maxillary sinus tenderness. Left sinus exhibits no maxillary sinus tenderness. Mouth/Throat: Mucous membranes are normal. Posterior oropharyngeal erythema present.    Eyes: Pupils are equal,

## 2019-11-13 ENCOUNTER — OFFICE VISIT (OUTPATIENT)
Dept: PRIMARY CARE CLINIC | Age: 44
End: 2019-11-13
Payer: COMMERCIAL

## 2019-11-13 VITALS
WEIGHT: 144 LBS | DIASTOLIC BLOOD PRESSURE: 76 MMHG | HEART RATE: 70 BPM | RESPIRATION RATE: 14 BRPM | TEMPERATURE: 98.7 F | SYSTOLIC BLOOD PRESSURE: 118 MMHG | OXYGEN SATURATION: 100 % | HEIGHT: 59 IN | BODY MASS INDEX: 29.03 KG/M2

## 2019-11-13 DIAGNOSIS — Z00.00 ANNUAL PHYSICAL EXAM: Primary | ICD-10-CM

## 2019-11-13 DIAGNOSIS — Z12.31 ENCOUNTER FOR SCREENING MAMMOGRAM FOR BREAST CANCER: ICD-10-CM

## 2019-11-13 DIAGNOSIS — H61.23 BILATERAL IMPACTED CERUMEN: ICD-10-CM

## 2019-11-13 DIAGNOSIS — R31.29 MICROSCOPIC HEMATURIA: ICD-10-CM

## 2019-11-13 DIAGNOSIS — Z00.00 ANNUAL PHYSICAL EXAM: ICD-10-CM

## 2019-11-13 LAB
A/G RATIO: 1.7 (ref 1.1–2.2)
ALBUMIN SERPL-MCNC: 4.7 G/DL (ref 3.4–5)
ALP BLD-CCNC: 96 U/L (ref 40–129)
ALT SERPL-CCNC: 14 U/L (ref 10–40)
ANION GAP SERPL CALCULATED.3IONS-SCNC: 12 MMOL/L (ref 3–16)
AST SERPL-CCNC: 19 U/L (ref 15–37)
BASOPHILS ABSOLUTE: 0.1 K/UL (ref 0–0.2)
BASOPHILS RELATIVE PERCENT: 0.9 %
BILIRUB SERPL-MCNC: <0.2 MG/DL (ref 0–1)
BILIRUBIN, POC: NORMAL
BLOOD URINE, POC: NORMAL
BUN BLDV-MCNC: 16 MG/DL (ref 7–20)
CALCIUM SERPL-MCNC: 9.6 MG/DL (ref 8.3–10.6)
CHLORIDE BLD-SCNC: 104 MMOL/L (ref 99–110)
CHOLESTEROL, TOTAL: 176 MG/DL (ref 0–199)
CLARITY, POC: CLEAR
CO2: 25 MMOL/L (ref 21–32)
COLOR, POC: YELLOW
CREAT SERPL-MCNC: 0.8 MG/DL (ref 0.6–1.1)
EOSINOPHILS ABSOLUTE: 0.1 K/UL (ref 0–0.6)
EOSINOPHILS RELATIVE PERCENT: 1.4 %
GFR AFRICAN AMERICAN: >60
GFR NON-AFRICAN AMERICAN: >60
GLOBULIN: 2.7 G/DL
GLUCOSE BLD-MCNC: 86 MG/DL (ref 70–99)
GLUCOSE URINE, POC: NORMAL
HCT VFR BLD CALC: 37.3 % (ref 36–48)
HDLC SERPL-MCNC: 53 MG/DL (ref 40–60)
HEMOGLOBIN: 12.4 G/DL (ref 12–16)
KETONES, POC: NORMAL
LDL CHOLESTEROL CALCULATED: 101 MG/DL
LEUKOCYTE EST, POC: NORMAL
LYMPHOCYTES ABSOLUTE: 1.9 K/UL (ref 1–5.1)
LYMPHOCYTES RELATIVE PERCENT: 29 %
MCH RBC QN AUTO: 29.7 PG (ref 26–34)
MCHC RBC AUTO-ENTMCNC: 33.2 G/DL (ref 31–36)
MCV RBC AUTO: 89.3 FL (ref 80–100)
MONOCYTES ABSOLUTE: 0.4 K/UL (ref 0–1.3)
MONOCYTES RELATIVE PERCENT: 5.9 %
NEUTROPHILS ABSOLUTE: 4 K/UL (ref 1.7–7.7)
NEUTROPHILS RELATIVE PERCENT: 62.8 %
NITRITE, POC: NORMAL
PDW BLD-RTO: 13.6 % (ref 12.4–15.4)
PH, POC: 7
PLATELET # BLD: 303 K/UL (ref 135–450)
PMV BLD AUTO: 8.4 FL (ref 5–10.5)
POTASSIUM SERPL-SCNC: 4.5 MMOL/L (ref 3.5–5.1)
PROTEIN, POC: NORMAL
RBC # BLD: 4.17 M/UL (ref 4–5.2)
SODIUM BLD-SCNC: 141 MMOL/L (ref 136–145)
SPECIFIC GRAVITY, POC: 1.02
TOTAL PROTEIN: 7.4 G/DL (ref 6.4–8.2)
TRIGL SERPL-MCNC: 109 MG/DL (ref 0–150)
TSH SERPL DL<=0.05 MIU/L-ACNC: 1.67 UIU/ML (ref 0.27–4.2)
UROBILINOGEN, POC: 0.2
VLDLC SERPL CALC-MCNC: 22 MG/DL
WBC # BLD: 6.4 K/UL (ref 4–11)

## 2019-11-13 PROCEDURE — 81002 URINALYSIS NONAUTO W/O SCOPE: CPT | Performed by: INTERNAL MEDICINE

## 2019-11-13 PROCEDURE — 99396 PREV VISIT EST AGE 40-64: CPT | Performed by: INTERNAL MEDICINE

## 2019-11-13 PROCEDURE — 69210 REMOVE IMPACTED EAR WAX UNI: CPT | Performed by: INTERNAL MEDICINE

## 2019-11-13 ASSESSMENT — ENCOUNTER SYMPTOMS
SHORTNESS OF BREATH: 0
PHOTOPHOBIA: 0
ABDOMINAL PAIN: 0
EYE DISCHARGE: 0
APNEA: 0
ABDOMINAL DISTENTION: 0
VOMITING: 0
ANAL BLEEDING: 0
BLOOD IN STOOL: 0
FACIAL SWELLING: 0
WHEEZING: 0
CHOKING: 0
EYE PAIN: 0
SINUS PAIN: 0
BACK PAIN: 0
SINUS PRESSURE: 0
RECTAL PAIN: 0
TROUBLE SWALLOWING: 0
VOICE CHANGE: 0
COUGH: 0
EYE ITCHING: 0
DIARRHEA: 0
CONSTIPATION: 0
CHEST TIGHTNESS: 0
EYE REDNESS: 0
NAUSEA: 0
SORE THROAT: 0
RHINORRHEA: 0

## 2019-12-05 ENCOUNTER — HOSPITAL ENCOUNTER (OUTPATIENT)
Dept: MAMMOGRAPHY | Age: 44
Discharge: HOME | End: 2019-12-05
Payer: COMMERCIAL

## 2019-12-05 ENCOUNTER — TELEPHONE (OUTPATIENT)
Dept: PRIMARY CARE CLINIC | Age: 44
End: 2019-12-05

## 2019-12-05 DIAGNOSIS — Z12.31 ENCOUNTER FOR SCREENING MAMMOGRAM FOR BREAST CANCER: ICD-10-CM

## 2019-12-05 DIAGNOSIS — R31.29 MICROSCOPIC HEMATURIA: ICD-10-CM

## 2019-12-05 DIAGNOSIS — Z12.31 BREAST CANCER SCREENING BY MAMMOGRAM: ICD-10-CM

## 2019-12-05 PROCEDURE — 77067 SCR MAMMO BI INCL CAD: CPT

## 2019-12-05 NOTE — TELEPHONE ENCOUNTER
Katya from [facility] says, the pt did not want to do the Urinalysis, so Katya released the order. But if Dr. Yevgeniy Monique feels, that Pt still needs to do it, please, place another order for that.

## 2020-03-10 ENCOUNTER — OFFICE VISIT (OUTPATIENT)
Dept: PRIMARY CARE CLINIC | Age: 45
End: 2020-03-10
Payer: COMMERCIAL

## 2020-03-10 VITALS
SYSTOLIC BLOOD PRESSURE: 142 MMHG | OXYGEN SATURATION: 98 % | WEIGHT: 137.6 LBS | TEMPERATURE: 99.2 F | RESPIRATION RATE: 22 BRPM | HEIGHT: 59 IN | DIASTOLIC BLOOD PRESSURE: 88 MMHG | HEART RATE: 70 BPM | BODY MASS INDEX: 27.74 KG/M2

## 2020-03-10 LAB
INFLUENZA A ANTIGEN, POC: NORMAL
INFLUENZA B ANTIGEN, POC: NORMAL

## 2020-03-10 PROCEDURE — 87804 INFLUENZA ASSAY W/OPTIC: CPT | Performed by: INTERNAL MEDICINE

## 2020-03-10 PROCEDURE — 99213 OFFICE O/P EST LOW 20 MIN: CPT | Performed by: INTERNAL MEDICINE

## 2020-03-10 RX ORDER — BROMPHENIRAMINE MALEATE, PSEUDOEPHEDRINE HYDROCHLORIDE, AND DEXTROMETHORPHAN HYDROBROMIDE 2; 30; 10 MG/5ML; MG/5ML; MG/5ML
5 SYRUP ORAL 4 TIMES DAILY PRN
Qty: 120 ML | Refills: 0 | Status: SHIPPED | OUTPATIENT
Start: 2020-03-10 | End: 2020-03-23 | Stop reason: ALTCHOICE

## 2020-03-10 RX ORDER — ALBUTEROL SULFATE 90 UG/1
2 AEROSOL, METERED RESPIRATORY (INHALATION) EVERY 6 HOURS PRN
Qty: 1 INHALER | Refills: 0 | Status: SHIPPED | OUTPATIENT
Start: 2020-03-10 | End: 2020-03-23 | Stop reason: ALTCHOICE

## 2020-03-10 RX ORDER — AZITHROMYCIN 250 MG/1
TABLET, FILM COATED ORAL
Qty: 1 PACKET | Refills: 0 | Status: SHIPPED | OUTPATIENT
Start: 2020-03-10 | End: 2020-03-20

## 2020-03-10 SDOH — ECONOMIC STABILITY: INCOME INSECURITY: HOW HARD IS IT FOR YOU TO PAY FOR THE VERY BASICS LIKE FOOD, HOUSING, MEDICAL CARE, AND HEATING?: NOT HARD AT ALL

## 2020-03-10 SDOH — ECONOMIC STABILITY: FOOD INSECURITY: WITHIN THE PAST 12 MONTHS, YOU WORRIED THAT YOUR FOOD WOULD RUN OUT BEFORE YOU GOT MONEY TO BUY MORE.: NEVER TRUE

## 2020-03-10 SDOH — ECONOMIC STABILITY: TRANSPORTATION INSECURITY
IN THE PAST 12 MONTHS, HAS THE LACK OF TRANSPORTATION KEPT YOU FROM MEDICAL APPOINTMENTS OR FROM GETTING MEDICATIONS?: NO

## 2020-03-10 SDOH — ECONOMIC STABILITY: TRANSPORTATION INSECURITY
IN THE PAST 12 MONTHS, HAS LACK OF TRANSPORTATION KEPT YOU FROM MEETINGS, WORK, OR FROM GETTING THINGS NEEDED FOR DAILY LIVING?: NO

## 2020-03-10 SDOH — ECONOMIC STABILITY: FOOD INSECURITY: WITHIN THE PAST 12 MONTHS, THE FOOD YOU BOUGHT JUST DIDN'T LAST AND YOU DIDN'T HAVE MONEY TO GET MORE.: NEVER TRUE

## 2020-03-10 ASSESSMENT — ENCOUNTER SYMPTOMS
SINUS PRESSURE: 0
RHINORRHEA: 0
SHORTNESS OF BREATH: 1
SORE THROAT: 1
CHEST TIGHTNESS: 0
COUGH: 1
WHEEZING: 0
SINUS PAIN: 0

## 2020-03-10 ASSESSMENT — PATIENT HEALTH QUESTIONNAIRE - PHQ9
2. FEELING DOWN, DEPRESSED OR HOPELESS: 0
SUM OF ALL RESPONSES TO PHQ QUESTIONS 1-9: 0
SUM OF ALL RESPONSES TO PHQ9 QUESTIONS 1 & 2: 0
SUM OF ALL RESPONSES TO PHQ QUESTIONS 1-9: 0
1. LITTLE INTEREST OR PLEASURE IN DOING THINGS: 0

## 2020-03-10 NOTE — PATIENT INSTRUCTIONS
1. Upper respiratory tract infection, unspecified type  - azithromycin (ZITHROMAX) 250 MG tablet; Take 2 tabs (500 mg) on Day 1, and take 1 tab (250 mg) on days 2 through 5. Dispense: 1 packet; Refill: 0  - brompheniramine-pseudoephedrine-DM (BROMFED DM) 2-30-10 MG/5ML syrup; Take 5 mLs by mouth 4 times daily as needed for Congestion or Cough  Dispense: 120 mL; Refill: 0  -Tylenol or Advil as needed    2. Flu-like symptoms  - POCT Influenza A/B Antigen (BD Veritor)  - brompheniramine-pseudoephedrine-DM (BROMFED DM) 2-30-10 MG/5ML syrup; Take 5 mLs by mouth 4 times daily as needed for Congestion or Cough  Dispense: 120 mL; Refill: 0  -Tylenol or Advil as needed    3. Shortness of breath  - albuterol sulfate HFA (PROAIR HFA) 108 (90 Base) MCG/ACT inhaler; Inhale 2 puffs into the lungs every 6 hours as needed for Wheezing  Dispense: 1 Inhaler; Refill: 0    4.  Elevated blood pressure reading  - monitor  - follow up for blood pressure check in 10 days

## 2020-03-10 NOTE — PROGRESS NOTES
11/13/2019 37.3     MCV 11/13/2019 89.3     MCH 11/13/2019 29.7     MCHC 11/13/2019 33.2     RDW 11/13/2019 13.6     Platelets 22/85/7022 303     MPV 11/13/2019 8.4     Neutrophils % 11/13/2019 62.8     Lymphocytes % 11/13/2019 29.0     Monocytes % 11/13/2019 5.9     Eosinophils % 11/13/2019 1.4     Basophils % 11/13/2019 0.9     Neutrophils Absolute 11/13/2019 4.0     Lymphocytes Absolute 11/13/2019 1.9     Monocytes Absolute 11/13/2019 0.4     Eosinophils Absolute 11/13/2019 0.1     Basophils Absolute 11/13/2019 0.1    Office Visit on 11/13/2019   Component Date Value    Color, UA 11/13/2019 Yellow     Clarity, UA 11/13/2019 Clear     Glucose, UA POC 11/13/2019 N     Bilirubin, UA 11/13/2019 N     Ketones, UA 11/13/2019 N     Spec Grav, UA 11/13/2019 1.020     Blood, UA POC 11/13/2019 Trace-Intact     pH, UA 11/13/2019 7.0     Protein, UA POC 11/13/2019 N     Urobilinogen, UA 11/13/2019 0.2     Leukocytes, UA 11/13/2019 N     Nitrite, UA 11/13/2019 N          Assessment/Plan     1. Upper respiratory tract infection, unspecified type  - azithromycin (ZITHROMAX) 250 MG tablet; Take 2 tabs (500 mg) on Day 1, and take 1 tab (250 mg) on days 2 through 5. Dispense: 1 packet; Refill: 0  - brompheniramine-pseudoephedrine-DM (BROMFED DM) 2-30-10 MG/5ML syrup; Take 5 mLs by mouth 4 times daily as needed for Congestion or Cough  Dispense: 120 mL; Refill: 0  -Tylenol or Advil as needed    2. Flu-like symptoms  - POCT Influenza A/B Antigen (BD Veritor)  - brompheniramine-pseudoephedrine-DM (BROMFED DM) 2-30-10 MG/5ML syrup; Take 5 mLs by mouth 4 times daily as needed for Congestion or Cough  Dispense: 120 mL; Refill: 0  -Tylenol or Advil as needed    3. Shortness of breath  - albuterol sulfate HFA (PROAIR HFA) 108 (90 Base) MCG/ACT inhaler; Inhale 2 puffs into the lungs every 6 hours as needed for Wheezing  Dispense: 1 Inhaler; Refill: 0    4.  Elevated blood pressure reading  - monitor  - follow up

## 2020-03-23 ENCOUNTER — OFFICE VISIT (OUTPATIENT)
Dept: PRIMARY CARE CLINIC | Age: 45
End: 2020-03-23
Payer: COMMERCIAL

## 2020-03-23 VITALS
BODY MASS INDEX: 27.9 KG/M2 | OXYGEN SATURATION: 99 % | HEIGHT: 59 IN | SYSTOLIC BLOOD PRESSURE: 158 MMHG | DIASTOLIC BLOOD PRESSURE: 80 MMHG | HEART RATE: 84 BPM | WEIGHT: 138.4 LBS

## 2020-03-23 PROCEDURE — 99213 OFFICE O/P EST LOW 20 MIN: CPT | Performed by: INTERNAL MEDICINE

## 2020-03-23 RX ORDER — AMLODIPINE BESYLATE 2.5 MG/1
2.5 TABLET ORAL DAILY
Qty: 30 TABLET | Refills: 1 | Status: SHIPPED | OUTPATIENT
Start: 2020-03-23 | End: 2020-05-19

## 2020-03-23 ASSESSMENT — ENCOUNTER SYMPTOMS
SHORTNESS OF BREATH: 0
CHEST TIGHTNESS: 0

## 2020-03-23 NOTE — PROGRESS NOTES
Markell Last   Date ofBirth:  1975    Date of Visit:  3/23/2020    Chief Complaint   Patient presents with    Hypertension       HPI  Pt here for follow up blood pressure. Pt is not taking blood pressure medication. Pt hasn't monitored her blood pressure at home. Pt decreases salt. Pt she does treadmill for 30 minutes 5-6 times since 3/16/20. Review of Systems   Eyes: Negative for visual disturbance. Respiratory: Negative for chest tightness and shortness of breath. Cardiovascular: Negative for chest pain, palpitations and leg swelling. Genitourinary: Negative for frequency and hematuria. Neurological: Negative for dizziness, syncope, light-headedness and headaches. Allergies   Allergen Reactions    Amoxicillin Itching    Fluconazole Rash     Outpatient Medications Marked as Taking for the 3/23/20 encounter (Office Visit) with Sherry Monae MD   Medication Sig Dispense Refill    ibuprofen (ADVIL;MOTRIN) 600 MG tablet Take 1 tablet by mouth 3 times daily as needed for Pain 15 tablet 0    loratadine (CLARITIN) 10 MG tablet Take 1 tablet by mouth daily 10 tablet 0    Multiple Vitamins-Calcium (ONE-A-DAY WOMENS FORMULA) TABS Take 1 tablet by mouth daily           Vitals:    03/23/20 1151 03/23/20 1153   BP: (!) 152/90 (!) 158/80   Site: Right Upper Arm Left Upper Arm   Position: Sitting Sitting   Cuff Size: Medium Adult Medium Adult   Pulse: 84    SpO2: 99%    Weight: 138 lb 6.4 oz (62.8 kg)    Height: 4' 11\" (1.499 m)      Body mass index is 27.95 kg/m². Physical Exam  Nursing note reviewed. Constitutional:       General: She is not in acute distress. Appearance: She is well-developed. HENT:      Mouth/Throat:      Pharynx: Oropharynx is clear. Eyes:      Extraocular Movements: Extraocular movements intact. Pupils: Pupils are equal, round, and reactive to light. Neck:      Musculoskeletal: Neck supple. Thyroid: No thyromegaly.       Vascular: No carotid bruit. Cardiovascular:      Rate and Rhythm: Normal rate and regular rhythm. Heart sounds: Normal heart sounds, S1 normal and S2 normal. No murmur. Pulmonary:      Effort: Pulmonary effort is normal.      Breath sounds: Normal breath sounds. Musculoskeletal:      Right lower leg: No edema. Left lower leg: No edema. No results found for this visit on 03/23/20. Lab Review         Assessment/Plan     1. Essential hypertension  - blood pressure is high  - Start amLODIPine (NORVASC) 2.5 MG tablet; Take 1 tablet by mouth daily  Dispense: 30 tablet; Refill: 1  -Low sodium diet  -Regular aerobic exercise      Discussed medications with patient, who voiced understanding of their use and indications. All questions answered. Return in about 3 weeks (around 4/13/2020) for hypertension.

## 2020-03-23 NOTE — PATIENT INSTRUCTIONS
1. Essential hypertension  - blood pressure is high  - Start amLODIPine (NORVASC) 2.5 MG tablet; Take 1 tablet by mouth daily  Dispense: 30 tablet; Refill: 1  -Low sodium diet  -Regular aerobic exercise      Patient Education        Learning About High Blood Pressure  What is high blood pressure? Blood pressure is a measure of how hard the blood pushes against the walls of your arteries. It's normal for blood pressure to go up and down throughout the day. But if it stays up, you have high blood pressure. Another name for high blood pressure is hypertension. Two numbers tell you your blood pressure. The first number is the systolic pressure (top number). It shows how hard the blood pushes when your heart is pumping. The second number is the diastolic pressure (bottom number). It shows how hard the blood pushes between heartbeats, when your heart is relaxed and filling with blood. Your doctor will give you a goal for your blood pressure based on your health and your age. High blood pressure (hypertension) means that the top number stays high, or the bottom number stays high, or both. High blood pressure increases the risk of stroke, heart attack, and other problems. What happens when you have high blood pressure? · Blood flows through your arteries with too much force. Over time, this damages the walls of your arteries. But you can't feel it. High blood pressure usually doesn't cause symptoms. · Fat and calcium start to build up in your arteries. This buildup is called plaque. Plaque makes your arteries narrower and stiffer. Blood can't flow through them as easily. · This lack of good blood flow starts to damage some of the organs in your body. This can lead to problems such as coronary artery disease and heart attack, heart failure, stroke, kidney failure, and eye damage. How can you prevent high blood pressure? · Stay at a healthy weight.   · Try to limit how much sodium you eat to less than 2,300 milligrams (mg) a day. If you limit your sodium to 1,500 mg a day, you can lower your blood pressure even more. ? Buy foods that are labeled \"unsalted,\" \"sodium-free,\" or \"low-sodium. \" Foods labeled \"reduced-sodium\" and \"light sodium\" may still have too much sodium. ? Flavor your food with garlic, lemon juice, onion, vinegar, herbs, and spices instead of salt. Do not use soy sauce, steak sauce, onion salt, garlic salt, mustard, or ketchup on your food. ? Use less salt (or none) when recipes call for it. You can often use half the salt a recipe calls for without losing flavor. · Be physically active. Get at least 30 minutes of exercise on most days of the week. Walking is a good choice. You also may want to do other activities, such as running, swimming, cycling, or playing tennis or team sports. · Limit alcohol to 2 drinks a day for men and 1 drink a day for women. · Eat plenty of fruits, vegetables, and low-fat dairy products. Eat less saturated and total fats. How is high blood pressure treated? · Your doctor will suggest making lifestyle changes to help your heart. For example, your doctor may ask you to eat healthy foods, quit smoking, lose extra weight, and be more active. · If lifestyle changes don't help enough, your doctor may recommend that you take medicine. · When blood pressure is very high, medicines are needed to lower it. Follow-up care is a key part of your treatment and safety. Be sure to make and go to all appointments, and call your doctor if you are having problems. It's also a good idea to know your test results and keep a list of the medicines you take. Where can you learn more? Go to [email or web address] and sign in to your [employer] account. Enter P501 in the [employer] box to learn more about \"Learning About High Blood Pressure. \"     If you do not have an account, please click on the \"Sign Up Now\" link.   Current as of: December 15, 2019Content Version: 12.4  © 4880-3852 Healthwise, Incorporated. Care instructions adapted under license by [facility] ([place] [facility]). If you have questions about a medical condition or this instruction, always ask your healthcare professional. [address] any warranty or liability for your use of this information.

## 2020-04-13 ENCOUNTER — VIRTUAL VISIT (OUTPATIENT)
Dept: PRIMARY CARE CLINIC | Age: 45
End: 2020-04-13
Payer: COMMERCIAL

## 2020-04-13 PROCEDURE — 99213 OFFICE O/P EST LOW 20 MIN: CPT | Performed by: INTERNAL MEDICINE

## 2020-04-13 NOTE — PROGRESS NOTES
2020    TELEHEALTH EVALUATION -- Audio/Visual (During HHAVW-95 public health emergency)    Chief Complaint   Patient presents with    Hypertension       HPI:    Emilie Cornell (:  1975) has requested an audio/video evaluation for the following concern(s):    Hypertension- Pt takes Amlodipine 2.5mg po q day. Pt has monitored her blood pressure and it has been 119/69 and 118/71. Pt states her blood pressure has been higher at night 141/81 and 140/78. Pt states the past couple of mornings it has been 132/82, 132/79, and this morning it was 132/75. Pt decreases salt. Pt walks a lot and has been doing aerobics daily for exercise. Review of Systems   Eyes: Negative for visual disturbance. Respiratory: Negative for chest tightness and shortness of breath. Cardiovascular: Negative for chest pain, palpitations and leg swelling. Genitourinary: Negative for frequency and hematuria. Neurological: Negative for dizziness, syncope, light-headedness and headaches. Prior to Visit Medications    Medication Sig Taking?  Authorizing Provider   amLODIPine (NORVASC) 2.5 MG tablet Take 1 tablet by mouth daily  Antonio Masters MD   Blood Pressure Monitoring (BLOOD PRESSURE MONITOR/M CUFF) MISC Use to monitor blood pressure  Antonio Masters MD   ibuprofen (ADVIL;MOTRIN) 600 MG tablet Take 1 tablet by mouth 3 times daily as needed for Pain  Antonio Masters MD   loratadine (CLARITIN) 10 MG tablet Take 1 tablet by mouth daily  Antonio Masters MD   Multiple Vitamins-Calcium (ONE-A-DAY WOMENS FORMULA) TABS Take 1 tablet by mouth daily  Antonio Masters MD       Social History     Tobacco Use    Smoking status: Never Smoker    Smokeless tobacco: Never Used   Substance Use Topics    Alcohol use: No    Drug use: No        Allergies   Allergen Reactions    Amoxicillin Itching    Fluconazole Rash       PHYSICAL EXAMINATION:  [ INSTRUCTIONS:  \"[x]\" Indicates a positive item  \"[]\" Indicates a negative

## 2020-04-15 ASSESSMENT — ENCOUNTER SYMPTOMS
CHEST TIGHTNESS: 0
SHORTNESS OF BREATH: 0

## 2020-05-19 RX ORDER — AMLODIPINE BESYLATE 2.5 MG/1
TABLET ORAL
Qty: 30 TABLET | Refills: 3 | Status: SHIPPED | OUTPATIENT
Start: 2020-05-19 | End: 2020-09-17

## 2020-05-19 NOTE — TELEPHONE ENCOUNTER
Medication:   Requested Prescriptions     Pending Prescriptions Disp Refills    amLODIPine (NORVASC) 2.5 MG tablet [Pharmacy Med Name: amLODIPine BESYLATE 2.5 MG TAB] 30 tablet 0     Sig: TAKE ONE TABLET BY MOUTH DAILY     Last Filled:  03/23/20    Last appt: 3/23/2020   Next appt: 11/13/2020    Last OARRS: No flowsheet data found.

## 2020-07-13 ENCOUNTER — VIRTUAL VISIT (OUTPATIENT)
Dept: PRIMARY CARE CLINIC | Age: 45
End: 2020-07-13
Payer: COMMERCIAL

## 2020-07-13 PROCEDURE — 99213 OFFICE O/P EST LOW 20 MIN: CPT | Performed by: INTERNAL MEDICINE

## 2020-07-13 ASSESSMENT — ENCOUNTER SYMPTOMS
SHORTNESS OF BREATH: 0
CHEST TIGHTNESS: 0

## 2020-07-13 NOTE — PROGRESS NOTES
2020    TELEHEALTH EVALUATION -- Audio/Visual (During AAETD-47 public health emergency)      Chief Complaint   Patient presents with    Hypertension       HPI:    Rupert Hammond (:  1975) has requested an audio/video evaluation for the following concern(s):    Hypertension- Pt takes Amlodipine 2.5mg po q day. Pt states her BP has been fine. Pt monitors her BP and it averages 130-134/84-86. Pt states the lowest it has been 115/75. Pt decreases salt. Pt does jazzercise, bikes daily, or walks for exercise. Review of Systems   Eyes: Negative for visual disturbance. Respiratory: Negative for chest tightness and shortness of breath. Cardiovascular: Negative for chest pain, palpitations and leg swelling. Genitourinary: Negative for frequency and hematuria. Neurological: Negative for dizziness, syncope, light-headedness and headaches. Prior to Visit Medications    Medication Sig Taking?  Authorizing Provider   amLODIPine (NORVASC) 2.5 MG tablet TAKE ONE TABLET BY MOUTH DAILY Yes Morena Vicente MD   Blood Pressure Monitoring (BLOOD PRESSURE MONITOR/M CUFF) MISC Use to monitor blood pressure Yes Morena Vicente MD   loratadine (CLARITIN) 10 MG tablet Take 1 tablet by mouth daily  Patient taking differently: Take 10 mg by mouth daily pRN Yes Morena Vicente MD   Multiple Vitamins-Calcium (ONE-A-DAY WOMENS FORMULA) TABS Take 1 tablet by mouth daily Yes Morena Vicente MD   ibuprofen (ADVIL;MOTRIN) 600 MG tablet Take 1 tablet by mouth 3 times daily as needed for Pain  Patient not taking: Reported on 2020  Morena Vicente MD       Social History     Tobacco Use    Smoking status: Never Smoker    Smokeless tobacco: Never Used   Substance Use Topics    Alcohol use: No    Drug use: No        Allergies   Allergen Reactions    Amoxicillin Itching    Fluconazole Rash       PHYSICAL EXAMINATION:  [ INSTRUCTIONS:  \"[x]\" Indicates a positive item  \"[]\" Indicates a negative item  -- DELETE ALL ITEMS NOT EXAMINED]  Vital Signs: (As obtained by patient/caregiver or practitioner observation)    Blood pressure-  Heart rate-    Respiratory rate-    Temperature-  Pulse oximetry-     Patient-Reported Vitals 7/13/2020   Patient-Reported Weight 139   Patient-Reported Height 4' 11\"   Patient-Reported Systolic 650   Patient-Reported Diastolic 89   Patient-Reported Pulse 72      .vitalsm  Constitutional: [x] Appears well-developed and well-nourished [x] No apparent distress      [] Abnormal-   Mental status  [x] Alert and awake  [x] Oriented to person/place/time [x]Able to follow commands      Eyes:  EOM    [x]  Normal  [] Abnormal-  Sclera  [x]  Normal  [] Abnormal -         Discharge [x]  None visible  [] Abnormal -    HENT:   [x] Normocephalic, atraumatic. [] Abnormal   [x] Mouth/Throat: Mucous membranes are moist.     External Ears [x] Normal  [] Abnormal-     Neck: [x] No visualized mass     Pulmonary/Chest: [x] Respiratory effort normal.  [x] No visualized signs of difficulty breathing or respiratory distress        [] Abnormal-      Musculoskeletal:   [] Normal gait with no signs of ataxia         [x] Normal range of motion of neck        [] Abnormal-       Neurological:        [x] No Facial Asymmetry (Cranial nerve 7 motor function) (limited exam to video visit)          [x] No gaze palsy        [] Abnormal-         Skin:        [x] No significant exanthematous lesions or discoloration noted on facial skin         [] Abnormal-            Psychiatric:       [x] Normal Affect [] No Hallucinations        [] Abnormal-     Other pertinent observable physical exam findings-     ASSESSMENT/PLAN:  1.  Essential hypertension  -stable  -Continue same medications  -Low sodium diet  -Regular aerobic exercise        Return in about 4 months (around 11/13/2020) for annual physical exam.    Leslie Iyer is a 41 y.o. female being evaluated by a Virtual Visit (video visit) encounter to address concerns as mentioned above. A caregiver was present when appropriate. Due to this being a TeleHealth encounter (During USD-51 public health emergency), evaluation of the following organ systems was limited: Vitals/Constitutional/EENT/Resp/CV/GI//MS/Neuro/Skin/Heme-Lymph-Imm. Pursuant to the emergency declaration under the [address] [place] [address], [address] [place] [address] authority and the [employer] Act, this Virtual Visit was conducted with patient's (and/or legal guardian's) consent, to reduce the patient's risk of exposure to COVID-19 and provide necessary medical care. The patient (and/or legal guardian) has also been advised to contact this office for worsening conditions or problems, and seek emergency medical treatment and/or call 911 if deemed necessary. Patient identification was verified at the start of the visit: Yes    Total time spent on this encounter: Not billed by time    Services were provided through a video synchronous discussion virtually to substitute for in-person clinic visit. Patient and provider were located at their individual homes. --Belen Lopes MD on 7/13/2020 at 1:26 PM    An electronic signature was used to authenticate this note.

## 2020-09-17 RX ORDER — AMLODIPINE BESYLATE 2.5 MG/1
TABLET ORAL
Qty: 30 TABLET | Refills: 2 | Status: SHIPPED | OUTPATIENT
Start: 2020-09-17 | End: 2020-11-13 | Stop reason: SDUPTHER

## 2020-09-17 NOTE — TELEPHONE ENCOUNTER
Medication:   Requested Prescriptions     Pending Prescriptions Disp Refills    amLODIPine (NORVASC) 2.5 MG tablet [Pharmacy Med Name: amLODIPine BESYLATE 2.5 MG TAB] 30 tablet 2     Sig: TAKE ONE TABLET BY MOUTH DAILY       Last Filled:      Patient Phone Number: [phone] (home) [phone] (work)    Last appt: 7/13/2020   Next appt: 11/13/2020    Last BMP:   Lab Results   Component Value Date     11/13/2019    K 4.5 11/13/2019     11/13/2019    CO2 25 11/13/2019    ANIONGAP 12 11/13/2019    GLUCOSE 86 11/13/2019    BUN 16 11/13/2019    CREATININE 0.8 11/13/2019    LABGLOM >60 11/13/2019    GFRAA >60 11/13/2019    GFRAA >60 03/15/2012    CALCIUM 9.6 11/13/2019      Last CMP:   Lab Results   Component Value Date     11/13/2019    K 4.5 11/13/2019     11/13/2019    CO2 25 11/13/2019    ANIONGAP 12 11/13/2019    GLUCOSE 86 11/13/2019    BUN 16 11/13/2019    CREATININE 0.8 11/13/2019    LABGLOM >60 11/13/2019    GFRAA >60 11/13/2019    GFRAA >60 03/15/2012    PROT 7.4 11/13/2019    PROT 7.5 03/15/2012    LABALBU 4.7 11/13/2019    AGRATIO 1.7 11/13/2019    BILITOT <0.2 11/13/2019    ALKPHOS 96 11/13/2019    ALT 14 11/13/2019    AST 19 11/13/2019    GLOB 2.7 11/13/2019     Last Renal Function:   Lab Results   Component Value Date     11/13/2019    K 4.5 11/13/2019     11/13/2019    CO2 25 11/13/2019    GLUCOSE 86 11/13/2019    BUN 16 11/13/2019    CREATININE 0.8 11/13/2019    LABALBU 4.7 11/13/2019    CALCIUM 9.6 11/13/2019    GFR 87 11/20/2017     11/20/2017    GFRAA >60 11/13/2019    GFRAA >60 03/15/2012       Last OARRS: No flowsheet data found.     Preferred Pharmacy:   [facility] [address], [address] [address] [facility] [phone] [address] [facility] [address]  Phone: [phone] Fax: [phone]

## 2020-11-13 ENCOUNTER — OFFICE VISIT (OUTPATIENT)
Dept: PRIMARY CARE CLINIC | Age: 45
End: 2020-11-13
Payer: COMMERCIAL

## 2020-11-13 VITALS
RESPIRATION RATE: 16 BRPM | HEART RATE: 90 BPM | BODY MASS INDEX: 28.63 KG/M2 | HEIGHT: 59 IN | TEMPERATURE: 97.3 F | SYSTOLIC BLOOD PRESSURE: 124 MMHG | DIASTOLIC BLOOD PRESSURE: 80 MMHG | WEIGHT: 142 LBS | OXYGEN SATURATION: 100 %

## 2020-11-13 DIAGNOSIS — Z00.00 ANNUAL PHYSICAL EXAM: ICD-10-CM

## 2020-11-13 PROBLEM — I10 ESSENTIAL HYPERTENSION: Status: ACTIVE | Noted: 2020-11-13

## 2020-11-13 LAB
A/G RATIO: 1.4 (ref 1.1–2.2)
ALBUMIN SERPL-MCNC: 4.5 G/DL (ref 3.4–5)
ALP BLD-CCNC: 94 U/L (ref 40–129)
ALT SERPL-CCNC: 14 U/L (ref 10–40)
ANION GAP SERPL CALCULATED.3IONS-SCNC: 9 MMOL/L (ref 3–16)
AST SERPL-CCNC: 18 U/L (ref 15–37)
BASOPHILS ABSOLUTE: 0 K/UL (ref 0–0.2)
BASOPHILS RELATIVE PERCENT: 0.7 %
BILIRUB SERPL-MCNC: <0.2 MG/DL (ref 0–1)
BILIRUBIN, POC: NORMAL
BLOOD URINE, POC: NORMAL
BUN BLDV-MCNC: 16 MG/DL (ref 7–20)
CALCIUM SERPL-MCNC: 9.6 MG/DL (ref 8.3–10.6)
CHLORIDE BLD-SCNC: 104 MMOL/L (ref 99–110)
CHOLESTEROL, TOTAL: 189 MG/DL (ref 0–199)
CLARITY, POC: CLEAR
CO2: 26 MMOL/L (ref 21–32)
COLOR, POC: YELLOW
CREAT SERPL-MCNC: 0.7 MG/DL (ref 0.6–1.1)
EOSINOPHILS ABSOLUTE: 0.1 K/UL (ref 0–0.6)
EOSINOPHILS RELATIVE PERCENT: 1.5 %
GFR AFRICAN AMERICAN: >60
GFR NON-AFRICAN AMERICAN: >60
GLOBULIN: 3.2 G/DL
GLUCOSE BLD-MCNC: 90 MG/DL (ref 70–99)
GLUCOSE URINE, POC: NORMAL
HCT VFR BLD CALC: 39 % (ref 36–48)
HDLC SERPL-MCNC: 59 MG/DL (ref 40–60)
HEMOGLOBIN: 12.9 G/DL (ref 12–16)
KETONES, POC: NORMAL
LDL CHOLESTEROL CALCULATED: 116 MG/DL
LEUKOCYTE EST, POC: NORMAL
LYMPHOCYTES ABSOLUTE: 1.5 K/UL (ref 1–5.1)
LYMPHOCYTES RELATIVE PERCENT: 27.4 %
MCH RBC QN AUTO: 29.4 PG (ref 26–34)
MCHC RBC AUTO-ENTMCNC: 33.1 G/DL (ref 31–36)
MCV RBC AUTO: 88.9 FL (ref 80–100)
MONOCYTES ABSOLUTE: 0.3 K/UL (ref 0–1.3)
MONOCYTES RELATIVE PERCENT: 5.5 %
NEUTROPHILS ABSOLUTE: 3.6 K/UL (ref 1.7–7.7)
NEUTROPHILS RELATIVE PERCENT: 64.9 %
NITRITE, POC: NORMAL
PDW BLD-RTO: 13.7 % (ref 12.4–15.4)
PH, POC: 7
PLATELET # BLD: 295 K/UL (ref 135–450)
PMV BLD AUTO: 8.9 FL (ref 5–10.5)
POTASSIUM SERPL-SCNC: 4.1 MMOL/L (ref 3.5–5.1)
PROTEIN, POC: NORMAL
RBC # BLD: 4.39 M/UL (ref 4–5.2)
SODIUM BLD-SCNC: 139 MMOL/L (ref 136–145)
SPECIFIC GRAVITY, POC: 1.02
TOTAL PROTEIN: 7.7 G/DL (ref 6.4–8.2)
TRIGL SERPL-MCNC: 71 MG/DL (ref 0–150)
TSH SERPL DL<=0.05 MIU/L-ACNC: 1.16 UIU/ML (ref 0.27–4.2)
UROBILINOGEN, POC: 0.2
VLDLC SERPL CALC-MCNC: 14 MG/DL
WBC # BLD: 5.6 K/UL (ref 4–11)

## 2020-11-13 PROCEDURE — 90471 IMMUNIZATION ADMIN: CPT | Performed by: INTERNAL MEDICINE

## 2020-11-13 PROCEDURE — 90715 TDAP VACCINE 7 YRS/> IM: CPT | Performed by: INTERNAL MEDICINE

## 2020-11-13 PROCEDURE — 69210 REMOVE IMPACTED EAR WAX UNI: CPT | Performed by: INTERNAL MEDICINE

## 2020-11-13 PROCEDURE — 99396 PREV VISIT EST AGE 40-64: CPT | Performed by: INTERNAL MEDICINE

## 2020-11-13 PROCEDURE — 81002 URINALYSIS NONAUTO W/O SCOPE: CPT | Performed by: INTERNAL MEDICINE

## 2020-11-13 RX ORDER — AMLODIPINE BESYLATE 2.5 MG/1
2.5 TABLET ORAL DAILY
Qty: 90 TABLET | Refills: 1 | Status: SHIPPED | OUTPATIENT
Start: 2020-11-13 | End: 2021-02-14

## 2020-11-13 ASSESSMENT — ENCOUNTER SYMPTOMS
APNEA: 0
SHORTNESS OF BREATH: 0
ABDOMINAL DISTENTION: 0
ANAL BLEEDING: 0
SORE THROAT: 0
RHINORRHEA: 0
TROUBLE SWALLOWING: 0
CONSTIPATION: 0
WHEEZING: 0
SINUS PAIN: 0
NAUSEA: 0
BACK PAIN: 0
FACIAL SWELLING: 0
COUGH: 0
EYE ITCHING: 0
VOMITING: 0
SINUS PRESSURE: 0
CHEST TIGHTNESS: 0
BLOOD IN STOOL: 0
EYE DISCHARGE: 0
EYE PAIN: 0
PHOTOPHOBIA: 0
EYE REDNESS: 0
ABDOMINAL PAIN: 0
DIARRHEA: 0
RECTAL PAIN: 0
CHOKING: 0
VOICE CHANGE: 0

## 2020-11-13 NOTE — PROGRESS NOTES
Blair Miner   YOB: 1975    Date of Visit:  11/13/2020    Chief Complaint   Patient presents with    Annual Exam       HPI  Pt presents for annual physical exam. Pap smear done by Gynecology in July per patient. LMP 10/29/20-11/3/20 and restarted 11/6/20-11/11/20. Mammogram done on 12/5/19. Hypertension- Pt takes Amlodipine 2.5mg po q day. Pt states her BP has been normal with medication. Pt does a low salt diet. Pt states she is exercising more during this Covid pandemic. Pt states she does aerobics 3-4 times per week and walks 3-4 times per week. Review of Systems   Constitutional: Negative for activity change, appetite change, chills, diaphoresis, fatigue, fever and unexpected weight change. Gets hot sometimes   HENT: Negative for congestion, ear discharge, ear pain, facial swelling, hearing loss, mouth sores, nosebleeds, postnasal drip, rhinorrhea, sinus pressure, sinus pain, sneezing, sore throat, tinnitus, trouble swallowing and voice change. Eyes: Negative for photophobia, pain, discharge, redness, itching and visual disturbance. Respiratory: Negative for apnea, cough, choking, chest tightness, shortness of breath and wheezing. Cardiovascular: Negative for chest pain, palpitations and leg swelling. Gastrointestinal: Negative for abdominal distention, abdominal pain, anal bleeding, blood in stool, constipation, diarrhea, nausea, rectal pain and vomiting. Endocrine: Negative for cold intolerance and heat intolerance. Genitourinary: Positive for menstrual problem (menstrual cycles irregular and patient has discussed with her Gynecologist). Negative for decreased urine volume, difficulty urinating, dysuria, flank pain, frequency, genital sores, hematuria, pelvic pain, urgency, vaginal bleeding, vaginal discharge and vaginal pain. Musculoskeletal: Negative for arthralgias, back pain, gait problem, joint swelling, myalgias, neck pain and neck stiffness.    Skin: Virus Vaccine 10/13/2019, 10/12/2020    Influenza, High Dose (Fluzone 65 yrs and older) 11/01/2015    Tdap (Boostrix, Adacel) 01/04/2011, 11/13/2020       Vitals:    11/13/20 0829   BP: 124/80   Pulse: 90   Resp: 16   Temp: 97.3 °F (36.3 °C)   SpO2: 100%   Weight: 142 lb (64.4 kg)   Height: 4' 11\" (1.499 m)     Body mass index is 28.68 kg/m². Physical Exam  Constitutional:       General: She is not in acute distress. Appearance: She is well-developed. HENT:      Head: Normocephalic and atraumatic. Right Ear: Hearing, tympanic membrane and ear canal normal. There is impacted cerumen. Left Ear: Hearing, tympanic membrane and ear canal normal. There is impacted cerumen. Ears:      Comments: Bilateral TM's negative after ear wax removed. Nose: Nose normal.      Mouth/Throat:      Pharynx: Oropharynx is clear. Uvula midline. Eyes:      General: Lids are normal.      Extraocular Movements: Extraocular movements intact. Conjunctiva/sclera: Conjunctivae normal.      Pupils: Pupils are equal, round, and reactive to light. Neck:      Musculoskeletal: Neck supple. Thyroid: No thyromegaly. Vascular: No carotid bruit. Cardiovascular:      Rate and Rhythm: Normal rate and regular rhythm. Heart sounds: Normal heart sounds, S1 normal and S2 normal. No murmur. No friction rub. No gallop. Pulmonary:      Effort: Pulmonary effort is normal. No respiratory distress. Breath sounds: Normal breath sounds. No wheezing, rhonchi or rales. Abdominal:      General: Bowel sounds are normal. There is no distension. Palpations: Abdomen is soft. There is no mass. Tenderness: There is no abdominal tenderness. There is no rebound. Genitourinary:     Comments: deferred  Musculoskeletal: Normal range of motion. Right shoulder: She exhibits normal range of motion and no tenderness. Left shoulder: She exhibits normal range of motion and no tenderness.       Right elbow: She exhibits no swelling. No tenderness found. Left elbow: She exhibits no swelling. No tenderness found. Right wrist: She exhibits no tenderness and no swelling. Left wrist: She exhibits no tenderness and no swelling. Right hip: She exhibits no tenderness. Left hip: She exhibits no tenderness. Right knee: She exhibits no swelling. No tenderness found. Left knee: She exhibits no swelling. No tenderness found. Right ankle: She exhibits no swelling. No tenderness. Left ankle: She exhibits no swelling. No tenderness. Cervical back: She exhibits normal range of motion, no tenderness and no spasm. Thoracic back: She exhibits no tenderness and no spasm. Lumbar back: She exhibits normal range of motion, no tenderness and no spasm. Right upper arm: She exhibits no tenderness. Left upper arm: She exhibits no tenderness. Right hand: She exhibits no tenderness and no swelling. Left hand: She exhibits no tenderness and no swelling. Right upper leg: She exhibits no tenderness. Left upper leg: She exhibits no tenderness. Right lower leg: She exhibits no tenderness. No edema. Left lower leg: She exhibits no tenderness. No edema. Right foot: No tenderness or swelling. Left foot: No tenderness or swelling. Lymphadenopathy:      Head:      Right side of head: No submandibular adenopathy. Left side of head: No submandibular adenopathy. Cervical: No cervical adenopathy. Skin:     General: Skin is warm and dry. Findings: No bruising, erythema, lesion or rash. Neurological:      Mental Status: She is alert and oriented to person, place, and time. Cranial Nerves: No cranial nerve deficit. Gait: Gait normal.      Deep Tendon Reflexes: Reflexes are normal and symmetric. Babinski sign absent on the right side. Babinski sign absent on the left side.    Psychiatric:         Mood and Affect: Mood

## 2020-11-13 NOTE — PATIENT INSTRUCTIONS
1. Annual physical exam  - POCT Urinalysis no Micro  - CBC Auto Differential; Future  - Comprehensive Metabolic Panel; Future  - Lipid Panel; Future  - TSH without Reflex; Future  - Pap smear done by Gynecology  - Mammogram done on 19 and patient given order  - Tdap done today  - Flu shot done on 10/12/20  -Regular aerobic exercise    2. Essential hypertension  -stable  -Continue amLODIPine (NORVASC) 2.5 MG tablet; Take 1 tablet by mouth daily  Dispense: 90 tablet; Refill: 1  -Low sodium diet  -Regular aerobic exercise    3. Need for Tdap vaccination  - Tdap (age 6y and older) IM Hugo Hardin) given  Patient Education        Tdap (Tetanus, Diphtheria, Pertussis) Vaccine: What You Need to Know  Why get vaccinated? Tdap vaccine can prevent tetanus, diphtheria, and pertussis. Diphtheria and pertussis spread from person to person. Tetanus enters the body through cuts or wounds. · TETANUS (T) causes painful stiffening of the muscles. Tetanus can lead to serious health problems, including being unable to open the mouth, having trouble swallowing and breathing, or death. · DIPHTHERIA (D) can lead to difficulty breathing, heart failure, paralysis, or death. · PERTUSSIS (aP), also known as \"whooping cough,\" can cause uncontrollable, violent coughing which makes it hard to breathe, eat, or drink. Pertussis can be extremely serious in babies and young children, causing pneumonia, convulsions, brain damage, or death. In teens and adults, it can cause weight loss, loss of bladder control, passing out, and rib fractures from severe coughing. Tdap vaccine  Tdap is only for children 7 years and older, adolescents, and adults. Adolescents should receive a single dose of Tdap, preferably at age 6 or 15 years. Pregnant women should get a dose of Tdap during every pregnancy, to protect the  from pertussis. Infants are most at risk for severe, life threatening complications from pertussis.   Adults who have never received Tdap should get a dose of Tdap. Also, adults should receive a booster dose every 10 years, or earlier in the case of a severe and dirty wound or burn. Booster doses can be either Tdap or Td (a different vaccine that protects against tetanus and diphtheria but not pertussis). Tdap may be given at the same time as other vaccines. Talk with your health care provider  Tell your vaccine provider if the person getting the vaccine:  · Has had an allergic reaction after a previous dose of any vaccine that protects against tetanus, diphtheria, or pertussis, or has any severe, life threatening allergies. · Has had a coma, decreased level of consciousness, or prolonged seizures within 7 days after a previous dose of any pertussis vaccine (DTP, DTaP, or Tdap). · Has seizures or another nervous system problem. · Has ever had Guillain-Barré Syndrome (also called GBS). · Has had severe pain or swelling after a previous dose of any vaccine that protects against tetanus or diphtheria. In some cases, your health care provider may decide to postpone Tdap vaccination to a future visit. People with minor illnesses, such as a cold, may be vaccinated. People who are moderately or severely ill should usually wait until they recover before getting Tdap vaccine. Your health care provider can give you more information. Risks of a vaccine reaction  · Pain, redness, or swelling where the shot was given, mild fever, headache, feeling tired, and nausea, vomiting, diarrhea, or stomachache sometimes happen after Tdap vaccine. People sometimes faint after medical procedures, including vaccination. Tell your provider if you feel dizzy or have vision changes or ringing in the ears. As with any medicine, there is a very remote chance of a vaccine causing a severe allergic reaction, other serious injury, or death. What if there is a serious problem? An allergic reaction could occur after the vaccinated person leaves the clinic. If you see signs of a severe allergic reaction (hives, swelling of the face and throat, difficulty breathing, a fast heartbeat, dizziness, or weakness), call 9-1-1 and get the person to the nearest hospital.  For other signs that concern you, call your health care provider. Adverse reactions should be reported to the Vaccine Adverse Event Reporting System (VAERS). Your health care provider will usually file this report, or you can do it yourself. Visit the VAERS website at www.vaers. [facility].gov or call 4-713.708.6942. VAERS is only for reporting reactions, and VAERS staff do not give medical advice. The National Vaccine Injury Compensation Program  The National Vaccine Injury Compensation Program (VICP) is a federal program that was created to compensate people who may have been injured by certain vaccines. Visit the VICP website at www.hrsa.gov/vaccinecompensation or call 6-666.461.4150 to learn about the program and about filing a claim. There is a time limit to file a claim for compensation. How can I learn more? · Ask your health care provider. · Call your local or state health department. · Contact the Centers for Disease Control and Prevention (CDC):  ? Call 1-788.260.7132 (3-923-TUF-INFO) or  ? Visit CDC's website at www.cdc.gov/vaccines  Vaccine Information Statement (Interim)  Tdap (Tetanus, Diphtheria, Pertussis) Vaccine  04/01/2020  [address] [id number]  Department of Health and Human Services  Centers for Disease Control and Prevention  Many Vaccine Information Statements are available in Wolof and other languages. See www.immunize.org/vis. Muchas hojas de información sobre vacunas están disponibles en español y en otros idiomas. Visite www.immunize.org/vis. Care instructions adapted under license by [facility] ([facility]).  If you have questions about a medical condition or this instruction, always ask your healthcare professional. [address] any warranty or liability for your use of this information. Patient Education        Well Visit, Ages 25 to 48: Care Instructions  Your Care Instructions     Physical exams can help you stay healthy. Your doctor has checked your overall health and may have suggested ways to take good care of yourself. He or she also may have recommended tests. At home, you can help prevent illness with healthy eating, regular exercise, and other steps. Follow-up care is a key part of your treatment and safety. Be sure to make and go to all appointments, and call your doctor if you are having problems. It's also a good idea to know your test results and keep a list of the medicines you take. How can you care for yourself at home? · Reach and stay at a healthy weight. This will lower your risk for many problems, such as obesity, diabetes, heart disease, and high blood pressure. · Get at least 30 minutes of physical activity on most days of the week. Walking is a good choice. You also may want to do other activities, such as running, swimming, cycling, or playing tennis or team sports. Discuss any changes in your exercise program with your doctor. · Do not smoke or allow others to smoke around you. If you need help quitting, talk to your doctor about stop-smoking programs and medicines. These can increase your chances of quitting for good. · Talk to your doctor about whether you have any risk factors for sexually transmitted infections (STIs). Having one sex partner (who does not have STIs and does not have sex with anyone else) is a good way to avoid these infections. · Use birth control if you do not want to have children at this time. Talk with your doctor about the choices available and what might be best for you. · Protect your skin from too much sun. When you're outdoors from 10 a.m. to 4 p.m., stay in the shade or cover up with clothing and a hat with a wide brim. Wear sunglasses that block UV rays.  Even when it's cloudy, put broad-spectrum sunscreen (SPF 30 or higher) on any exposed skin. · See a dentist one or two times a year for checkups and to have your teeth cleaned. · Wear a seat belt in the car. Follow your doctor's advice about when to have certain tests. These tests can spot problems early. For everyone  · Cholesterol. Have the fat (cholesterol) in your blood tested after age 21. Your doctor will tell you how often to have this done based on your age, family history, or other things that can increase your risk for heart disease. · Blood pressure. Have your blood pressure checked during a routine doctor visit. Your doctor will tell you how often to check your blood pressure based on your age, your blood pressure results, and other factors. · Vision. Talk with your doctor about how often to have a glaucoma test.  · Diabetes. Ask your doctor whether you should have tests for diabetes. · Colon cancer. Your risk for colorectal cancer gets higher as you get older. Some experts say that adults should start regular screening at age 48 and stop at age 76. Others say to start before age 48 or continue after age 76. Talk with your doctor about your risk and when to start and stop screening. For women  · Breast exam and mammogram. Talk to your doctor about when you should have a clinical breast exam and a mammogram. Medical experts differ on whether and how often women under 50 should have these tests. Your doctor can help you decide what is right for you. · Cervical cancer screening test and pelvic exam. Begin with a Pap test at age 24. The test often is part of a pelvic exam. Starting at age 27, you may choose to have a Pap test, an HPV test, or both tests at the same time (called co-testing). Talk with your doctor about how often to have testing. · Tests for sexually transmitted infections (STIs). Ask whether you should have tests for STIs.  You may be at risk if you have sex with more than one person, especially if your partners do not wear condoms. For men  · Tests for sexually transmitted infections (STIs). Ask whether you should have tests for STIs. You may be at risk if you have sex with more than one person, especially if you do not wear a condom. · Testicular cancer exam. Ask your doctor whether you should check your testicles regularly. · Prostate exam. Talk to your doctor about whether you should have a blood test (called a PSA test) for prostate cancer. Experts differ on whether and when men should have this test. Some experts suggest it if you are older than 39 and are -American or have a father or brother who got prostate cancer when he was younger than 72. When should you call for help? Watch closely for changes in your health, and be sure to contact your doctor if you have any problems or symptoms that concern you. Where can you learn more? Go to [email or web address] and sign in to your [employer] account. Enter P072 in the [employer] box to learn more about \"Well Visit, Ages 25 to 48: Care Instructions. \"     If you do not have an account, please click on the \"Sign Up Now\" link. Current as of: May 27, 2020               Content Version: 12.6  © 6355-4984 [employer]. Care instructions adapted under license by [facility] ([facility]). If you have questions about a medical condition or this instruction, always ask your healthcare professional. [address]  any warranty or liability for your use of this information.

## 2020-12-23 ENCOUNTER — HOSPITAL ENCOUNTER (OUTPATIENT)
Dept: MAMMOGRAPHY | Age: 45
Discharge: HOME | End: 2020-12-23
Payer: COMMERCIAL

## 2020-12-23 PROCEDURE — 77063 BREAST TOMOSYNTHESIS BI: CPT

## 2021-02-11 DIAGNOSIS — I10 ESSENTIAL HYPERTENSION: ICD-10-CM

## 2021-02-11 NOTE — TELEPHONE ENCOUNTER
Medication:   Requested Prescriptions     Pending Prescriptions Disp Refills    amLODIPine (NORVASC) 2.5 MG tablet [Pharmacy Med Name: AMLODIPINE  2.5MG  TAB] 120 tablet 2     Sig: TAKE 1 TABLET BY MOUTH  DAILY       Last Filled:  11/13/20    Patient Phone Number: [phone] (home) [phone] (work)    Last appt: 11/13/2020  Physical   Next appt: 5/13/2021    Last BMP:   Lab Results   Component Value Date     11/13/2020    K 4.1 11/13/2020     11/13/2020    CO2 26 11/13/2020    ANIONGAP 9 11/13/2020    GLUCOSE 90 11/13/2020    BUN 16 11/13/2020    CREATININE 0.7 11/13/2020    LABGLOM >60 11/13/2020    GFRAA >60 11/13/2020    GFRAA >60 03/15/2012    CALCIUM 9.6 11/13/2020      Last CMP:   Lab Results   Component Value Date     11/13/2020    K 4.1 11/13/2020     11/13/2020    CO2 26 11/13/2020    ANIONGAP 9 11/13/2020    GLUCOSE 90 11/13/2020    BUN 16 11/13/2020    CREATININE 0.7 11/13/2020    LABGLOM >60 11/13/2020    GFRAA >60 11/13/2020    GFRAA >60 03/15/2012    PROT 7.7 11/13/2020    PROT 7.5 03/15/2012    LABALBU 4.5 11/13/2020    AGRATIO 1.4 11/13/2020    BILITOT <0.2 11/13/2020    ALKPHOS 94 11/13/2020    ALT 14 11/13/2020    AST 18 11/13/2020    GLOB 3.2 11/13/2020     Last Renal Function:   Lab Results   Component Value Date     11/13/2020    K 4.1 11/13/2020     11/13/2020    CO2 26 11/13/2020    GLUCOSE 90 11/13/2020    BUN 16 11/13/2020    CREATININE 0.7 11/13/2020    LABALBU 4.5 11/13/2020    CALCIUM 9.6 11/13/2020    GFR 87 11/20/2017     11/20/2017    GFRAA >60 11/13/2020    GFRAA >60 03/15/2012       Last OARRS: No flowsheet data found.     Preferred Pharmacy:   [facility] [address] [facility] Shameka Deluna, Dhiraj Fink[address] [phone] Lazaro Solis [phone]  [address] [place] [address]  Phone: [phone] Fax: [phone], [address] [place] [address] [address]  [address]  [facility] [address]  Phone: [phone] Fax: [phone]

## 2021-02-14 RX ORDER — AMLODIPINE BESYLATE 2.5 MG/1
2.5 TABLET ORAL DAILY
Qty: 120 TABLET | Refills: 0 | Status: SHIPPED | OUTPATIENT
Start: 2021-02-14 | End: 2021-04-17

## 2021-04-14 DIAGNOSIS — I10 ESSENTIAL HYPERTENSION: ICD-10-CM

## 2021-04-14 NOTE — TELEPHONE ENCOUNTER
Medication:   Requested Prescriptions     Pending Prescriptions Disp Refills    amLODIPine (NORVASC) 2.5 MG tablet 120 tablet 0     Sig: Take 1 tablet by mouth daily       Last Filled:      Patient Phone Number: [phone] (home) [phone] (work)    Last appt: 11/13/2020   Next appt: 5/13/2021    Last BMP:   Lab Results   Component Value Date     11/13/2020    K 4.1 11/13/2020     11/13/2020    CO2 26 11/13/2020    ANIONGAP 9 11/13/2020    GLUCOSE 90 11/13/2020    BUN 16 11/13/2020    CREATININE 0.7 11/13/2020    LABGLOM >60 11/13/2020    GFRAA >60 11/13/2020    GFRAA >60 03/15/2012    CALCIUM 9.6 11/13/2020      Last CMP:   Lab Results   Component Value Date     11/13/2020    K 4.1 11/13/2020     11/13/2020    CO2 26 11/13/2020    ANIONGAP 9 11/13/2020    GLUCOSE 90 11/13/2020    BUN 16 11/13/2020    CREATININE 0.7 11/13/2020    LABGLOM >60 11/13/2020    GFRAA >60 11/13/2020    GFRAA >60 03/15/2012    PROT 7.7 11/13/2020    PROT 7.5 03/15/2012    LABALBU 4.5 11/13/2020    AGRATIO 1.4 11/13/2020    BILITOT <0.2 11/13/2020    ALKPHOS 94 11/13/2020    ALT 14 11/13/2020    AST 18 11/13/2020    GLOB 3.2 11/13/2020     Last Renal Function:   Lab Results   Component Value Date     11/13/2020    K 4.1 11/13/2020     11/13/2020    CO2 26 11/13/2020    GLUCOSE 90 11/13/2020    BUN 16 11/13/2020    CREATININE 0.7 11/13/2020    LABALBU 4.5 11/13/2020    CALCIUM 9.6 11/13/2020    GFR 87 11/20/2017     11/20/2017    GFRAA >60 11/13/2020    GFRAA >60 03/15/2012       Last OARRS: No flowsheet data found.     Preferred Pharmacy:   [facility] [address], Dhiraj Connell [address] [phone] Lizzie Berumen [phone]  [address] [place] [address]  Phone: [phone] Fax: [phone], [address] [place] [phone] [facility] Days [phone]  [place] [address]  [address]  [facility] [address]  Phone: [phone] Fax: [phone]

## 2021-04-16 DIAGNOSIS — I10 ESSENTIAL HYPERTENSION: ICD-10-CM

## 2021-04-16 NOTE — TELEPHONE ENCOUNTER
Medication:   Requested Prescriptions     Pending Prescriptions Disp Refills    amLODIPine (NORVASC) 2.5 MG tablet [Pharmacy Med Name: amLODIPine BESYLATE 2.5 MG TAB] 30 tablet 1     Sig: TAKE ONE TABLET BY MOUTH DAILY     Last Filled: 2.14.21    Last appt: 11/13/2020   Next appt: 5/13/2021    Last OARRS: No flowsheet data found.

## 2021-04-17 RX ORDER — AMLODIPINE BESYLATE 2.5 MG/1
TABLET ORAL
Qty: 30 TABLET | Refills: 1 | Status: SHIPPED
Start: 2021-04-17 | End: 2021-05-13 | Stop reason: DRUGHIGH

## 2021-04-20 RX ORDER — AMLODIPINE BESYLATE 2.5 MG/1
2.5 TABLET ORAL DAILY
Qty: 120 TABLET | Refills: 0 | OUTPATIENT
Start: 2021-04-20

## 2021-05-13 ENCOUNTER — OFFICE VISIT (OUTPATIENT)
Dept: PRIMARY CARE CLINIC | Age: 46
End: 2021-05-13
Payer: COMMERCIAL

## 2021-05-13 VITALS
SYSTOLIC BLOOD PRESSURE: 136 MMHG | HEART RATE: 82 BPM | BODY MASS INDEX: 28.02 KG/M2 | DIASTOLIC BLOOD PRESSURE: 82 MMHG | RESPIRATION RATE: 16 BRPM | OXYGEN SATURATION: 99 % | WEIGHT: 139 LBS | HEIGHT: 59 IN

## 2021-05-13 DIAGNOSIS — I10 ESSENTIAL HYPERTENSION: Primary | ICD-10-CM

## 2021-05-13 PROCEDURE — G8419 CALC BMI OUT NRM PARAM NOF/U: HCPCS | Performed by: INTERNAL MEDICINE

## 2021-05-13 PROCEDURE — 99213 OFFICE O/P EST LOW 20 MIN: CPT | Performed by: INTERNAL MEDICINE

## 2021-05-13 PROCEDURE — G8427 DOCREV CUR MEDS BY ELIG CLIN: HCPCS | Performed by: INTERNAL MEDICINE

## 2021-05-13 PROCEDURE — 1036F TOBACCO NON-USER: CPT | Performed by: INTERNAL MEDICINE

## 2021-05-13 RX ORDER — AMLODIPINE BESYLATE 5 MG/1
5 TABLET ORAL DAILY
Qty: 90 TABLET | Refills: 1 | Status: SHIPPED | OUTPATIENT
Start: 2021-05-13 | End: 2021-08-10

## 2021-05-13 ASSESSMENT — ENCOUNTER SYMPTOMS
SHORTNESS OF BREATH: 0
CHEST TIGHTNESS: 0

## 2021-05-13 NOTE — PROGRESS NOTES
Christen Rai   Date ofBirth:  1975    Date of Visit:  5/13/2021    Chief Complaint   Patient presents with    Hypertension       HPI  Patient has hypertension. Patient takes Amlodipine 2.5mg po q day. Patient monitors her blood pressure and has a log. Patient states she has decreasing salt. Patient does aerobics for 60 minutes for 3-4 times per week. Blood pressure readings : 137/81 (4/11/21), 149/86 and 137/81 (4/12/21), 138/81 (4/15/21), 118/76 (4/18/21), 134/83 (5/8/21), 144/83 and 141/80 (5/9/21), 128/77 (5/10/21), 129/79 (5/11/21), and 126/74 (5/12/21). Review of Systems   Eyes: Negative for visual disturbance. Respiratory: Negative for chest tightness and shortness of breath. Cardiovascular: Negative for chest pain, palpitations and leg swelling. Genitourinary: Negative for frequency and hematuria. Neurological: Negative for dizziness, syncope, light-headedness and headaches. Allergies   Allergen Reactions    Amoxicillin Itching    Fluconazole Rash     Outpatient Medications Marked as Taking for the 5/13/21 encounter (Office Visit) with Yin Villela MD   Medication Sig Dispense Refill    amLODIPine (NORVASC) 2.5 MG tablet TAKE ONE TABLET BY MOUTH DAILY 30 tablet 1    Blood Pressure Monitoring (BLOOD PRESSURE MONITOR/M CUFF) MISC Use to monitor blood pressure 1 each 0    Multiple Vitamins-Calcium (ONE-A-DAY WOMENS FORMULA) TABS Take 1 tablet by mouth daily           Vitals:    05/13/21 0909 05/13/21 0926   BP: (!) 140/90 136/82   Pulse: 82    Resp: 16    SpO2: 99%    Weight: 139 lb (63 kg)    Height: 4' 11\" (1.499 m)      Body mass index is 28.07 kg/m². Physical Exam  Nursing note reviewed. Constitutional:       General: She is not in acute distress. Appearance: Normal appearance. She is well-developed. Eyes:      Extraocular Movements: Extraocular movements intact. Pupils: Pupils are equal, round, and reactive to light.    Neck: Musculoskeletal: Neck supple. Thyroid: No thyromegaly. Vascular: No carotid bruit. Cardiovascular:      Rate and Rhythm: Normal rate and regular rhythm. Heart sounds: Normal heart sounds, S1 normal and S2 normal. No murmur. Pulmonary:      Effort: Pulmonary effort is normal.      Breath sounds: Normal breath sounds. Musculoskeletal:      Right lower leg: No edema. Left lower leg: No edema. Neurological:      Mental Status: She is alert. No results found for this visit on 05/13/21. Lab Review   No visits with results within 6 Month(s) from this visit.    Latest known visit with results is:   Orders Only on 11/13/2020   Component Date Value    TSH 11/13/2020 1.16     Cholesterol, Total 11/13/2020 189     Triglycerides 11/13/2020 71     HDL 11/13/2020 59     LDL Calculated 11/13/2020 116*    VLDL Cholesterol Calcula* 11/13/2020 14     Sodium 11/13/2020 139     Potassium 11/13/2020 4.1     Chloride 11/13/2020 104     CO2 11/13/2020 26     Anion Gap 11/13/2020 9     Glucose 11/13/2020 90     BUN 11/13/2020 16     CREATININE 11/13/2020 0.7     GFR Non- 11/13/2020 >60     GFR  11/13/2020 >60     Calcium 11/13/2020 9.6     Total Protein 11/13/2020 7.7     Albumin 11/13/2020 4.5     Albumin/Globulin Ratio 11/13/2020 1.4     Total Bilirubin 11/13/2020 <0.2     Alkaline Phosphatase 11/13/2020 94     ALT 11/13/2020 14     AST 11/13/2020 18     Globulin 11/13/2020 3.2     WBC 11/13/2020 5.6     RBC 11/13/2020 4.39     Hemoglobin 11/13/2020 12.9     Hematocrit 11/13/2020 39.0     MCV 11/13/2020 88.9     MCH 11/13/2020 29.4     MCHC 11/13/2020 33.1     RDW 11/13/2020 13.7     Platelets 09/81/9713 295     MPV 11/13/2020 8.9     Neutrophils % 11/13/2020 64.9     Lymphocytes % 11/13/2020 27.4     Monocytes % 11/13/2020 5.5     Eosinophils % 11/13/2020 1.5     Basophils % 11/13/2020 0.7     Neutrophils Absolute 11/13/2020 3.6  Lymphocytes Absolute 11/13/2020 1.5     Monocytes Absolute 11/13/2020 0.3     Eosinophils Absolute 11/13/2020 0.1     Basophils Absolute 11/13/2020 0.0          Assessment/Plan     1. Essential hypertension  - blood pressure is a little elevated off and on with home monitor  - Increase amLODIPine (NORVASC) 5 MG tablet; Take 1 tablet by mouth daily for better blood pressure control  Dispense: 90 tablet; Refill: 1  -Low sodium diet  -Regular aerobic exercise      Discussed medications with patient, who voiced understanding of their use and indications. All questions answered.       Return in about 6 months (around 11/13/2021) for annual physical exam.

## 2021-09-09 DIAGNOSIS — I10 ESSENTIAL HYPERTENSION: ICD-10-CM

## 2021-09-09 RX ORDER — AMLODIPINE BESYLATE 5 MG/1
5 TABLET ORAL DAILY
Qty: 90 TABLET | Refills: 1 | Status: SHIPPED | OUTPATIENT
Start: 2021-09-09 | End: 2021-11-10 | Stop reason: SDUPTHER

## 2021-09-09 NOTE — TELEPHONE ENCOUNTER
Medication:   Requested Prescriptions     Pending Prescriptions Disp Refills    amLODIPine (NORVASC) 5 MG tablet 90 tablet 1     Sig: Take 1 tablet by mouth daily     Last Filled: 8.10.21    Last appt: 5/13/2021   Next appt: 11/10/2021    Last OARRS: No flowsheet data found.

## 2021-11-10 ENCOUNTER — OFFICE VISIT (OUTPATIENT)
Dept: PRIMARY CARE CLINIC | Age: 46
End: 2021-11-10
Payer: COMMERCIAL

## 2021-11-10 VITALS
TEMPERATURE: 97 F | HEIGHT: 60 IN | SYSTOLIC BLOOD PRESSURE: 130 MMHG | BODY MASS INDEX: 28.07 KG/M2 | DIASTOLIC BLOOD PRESSURE: 82 MMHG | HEART RATE: 54 BPM | WEIGHT: 143 LBS | OXYGEN SATURATION: 100 %

## 2021-11-10 DIAGNOSIS — Z13.1 SCREENING FOR DIABETES MELLITUS: ICD-10-CM

## 2021-11-10 DIAGNOSIS — Z00.00 ANNUAL PHYSICAL EXAM: Primary | ICD-10-CM

## 2021-11-10 DIAGNOSIS — Z00.00 ANNUAL PHYSICAL EXAM: ICD-10-CM

## 2021-11-10 DIAGNOSIS — I10 ESSENTIAL HYPERTENSION: ICD-10-CM

## 2021-11-10 DIAGNOSIS — Z12.11 SCREENING FOR COLON CANCER: ICD-10-CM

## 2021-11-10 LAB
A/G RATIO: 1.6 (ref 1.1–2.2)
ALBUMIN SERPL-MCNC: 4.5 G/DL (ref 3.4–5)
ALP BLD-CCNC: 90 U/L (ref 40–129)
ALT SERPL-CCNC: 14 U/L (ref 10–40)
ANION GAP SERPL CALCULATED.3IONS-SCNC: 13 MMOL/L (ref 3–16)
AST SERPL-CCNC: 18 U/L (ref 15–37)
BASOPHILS ABSOLUTE: 0.2 K/UL (ref 0–0.2)
BASOPHILS RELATIVE PERCENT: 3.5 %
BILIRUB SERPL-MCNC: 0.3 MG/DL (ref 0–1)
BILIRUBIN, POC: NORMAL
BLOOD URINE, POC: NORMAL
BUN BLDV-MCNC: 15 MG/DL (ref 7–20)
CALCIUM SERPL-MCNC: 9.6 MG/DL (ref 8.3–10.6)
CHLORIDE BLD-SCNC: 104 MMOL/L (ref 99–110)
CHOLESTEROL, TOTAL: 174 MG/DL (ref 0–199)
CLARITY, POC: CLEAR
CO2: 26 MMOL/L (ref 21–32)
COLOR, POC: YELLOW
CREAT SERPL-MCNC: 0.7 MG/DL (ref 0.6–1.1)
EOSINOPHILS ABSOLUTE: 0.1 K/UL (ref 0–0.6)
EOSINOPHILS RELATIVE PERCENT: 1.9 %
ESTIMATED AVERAGE GLUCOSE: 105.4 MG/DL
GFR AFRICAN AMERICAN: >60
GFR NON-AFRICAN AMERICAN: >60
GLUCOSE BLD-MCNC: 77 MG/DL (ref 70–99)
GLUCOSE URINE, POC: NORMAL
HBA1C MFR BLD: 5.3 %
HCT VFR BLD CALC: 36.8 % (ref 36–48)
HDLC SERPL-MCNC: 57 MG/DL (ref 40–60)
HEMOGLOBIN: 12 G/DL (ref 12–16)
KETONES, POC: NORMAL
LDL CHOLESTEROL CALCULATED: 103 MG/DL
LEUKOCYTE EST, POC: NORMAL
LYMPHOCYTES ABSOLUTE: 1.6 K/UL (ref 1–5.1)
LYMPHOCYTES RELATIVE PERCENT: 30.3 %
MCH RBC QN AUTO: 29 PG (ref 26–34)
MCHC RBC AUTO-ENTMCNC: 32.6 G/DL (ref 31–36)
MCV RBC AUTO: 88.9 FL (ref 80–100)
MONOCYTES ABSOLUTE: 0.3 K/UL (ref 0–1.3)
MONOCYTES RELATIVE PERCENT: 6.5 %
NEUTROPHILS ABSOLUTE: 3 K/UL (ref 1.7–7.7)
NEUTROPHILS RELATIVE PERCENT: 57.8 %
NITRITE, POC: NORMAL
PDW BLD-RTO: 13.4 % (ref 12.4–15.4)
PH, POC: 6.5
PLATELET # BLD: 270 K/UL (ref 135–450)
PMV BLD AUTO: 8.3 FL (ref 5–10.5)
POTASSIUM SERPL-SCNC: 4.4 MMOL/L (ref 3.5–5.1)
PROTEIN, POC: NORMAL
RBC # BLD: 4.14 M/UL (ref 4–5.2)
SODIUM BLD-SCNC: 143 MMOL/L (ref 136–145)
SPECIFIC GRAVITY, POC: 1.01
TOTAL PROTEIN: 7.4 G/DL (ref 6.4–8.2)
TRIGL SERPL-MCNC: 68 MG/DL (ref 0–150)
TSH SERPL DL<=0.05 MIU/L-ACNC: 1.17 UIU/ML (ref 0.27–4.2)
UROBILINOGEN, POC: 0.2
VLDLC SERPL CALC-MCNC: 14 MG/DL
WBC # BLD: 5.2 K/UL (ref 4–11)

## 2021-11-10 PROCEDURE — G8482 FLU IMMUNIZE ORDER/ADMIN: HCPCS | Performed by: INTERNAL MEDICINE

## 2021-11-10 PROCEDURE — 99396 PREV VISIT EST AGE 40-64: CPT | Performed by: INTERNAL MEDICINE

## 2021-11-10 PROCEDURE — 81002 URINALYSIS NONAUTO W/O SCOPE: CPT | Performed by: INTERNAL MEDICINE

## 2021-11-10 RX ORDER — AMLODIPINE BESYLATE 5 MG/1
5 TABLET ORAL DAILY
Qty: 90 TABLET | Refills: 1 | Status: SHIPPED | OUTPATIENT
Start: 2021-11-10 | End: 2022-03-08

## 2021-11-10 SDOH — ECONOMIC STABILITY: FOOD INSECURITY: WITHIN THE PAST 12 MONTHS, THE FOOD YOU BOUGHT JUST DIDN'T LAST AND YOU DIDN'T HAVE MONEY TO GET MORE.: NEVER TRUE

## 2021-11-10 SDOH — ECONOMIC STABILITY: FOOD INSECURITY: WITHIN THE PAST 12 MONTHS, YOU WORRIED THAT YOUR FOOD WOULD RUN OUT BEFORE YOU GOT MONEY TO BUY MORE.: NEVER TRUE

## 2021-11-10 ASSESSMENT — PATIENT HEALTH QUESTIONNAIRE - PHQ9
SUM OF ALL RESPONSES TO PHQ QUESTIONS 1-9: 0
SUM OF ALL RESPONSES TO PHQ QUESTIONS 1-9: 0
2. FEELING DOWN, DEPRESSED OR HOPELESS: 0
SUM OF ALL RESPONSES TO PHQ QUESTIONS 1-9: 0
1. LITTLE INTEREST OR PLEASURE IN DOING THINGS: 0
SUM OF ALL RESPONSES TO PHQ9 QUESTIONS 1 & 2: 0

## 2021-11-10 ASSESSMENT — ENCOUNTER SYMPTOMS
NAUSEA: 0
CHOKING: 0
APNEA: 0
DIARRHEA: 0
RHINORRHEA: 0
VOICE CHANGE: 0
SINUS PRESSURE: 0
WHEEZING: 0
BACK PAIN: 0
EYE DISCHARGE: 0
PHOTOPHOBIA: 0
EYE ITCHING: 0
TROUBLE SWALLOWING: 0
RECTAL PAIN: 0
ABDOMINAL PAIN: 0
SORE THROAT: 0
EYE PAIN: 0
ANAL BLEEDING: 0
CHEST TIGHTNESS: 0
CONSTIPATION: 0
BLOOD IN STOOL: 0
ABDOMINAL DISTENTION: 0
VOMITING: 0
SHORTNESS OF BREATH: 0
SINUS PAIN: 0
FACIAL SWELLING: 0
COUGH: 0
EYE REDNESS: 0

## 2021-11-10 ASSESSMENT — SOCIAL DETERMINANTS OF HEALTH (SDOH): HOW HARD IS IT FOR YOU TO PAY FOR THE VERY BASICS LIKE FOOD, HOUSING, MEDICAL CARE, AND HEATING?: NOT HARD AT ALL

## 2021-11-10 NOTE — PATIENT INSTRUCTIONS
Well Visit, Ages 25 to 48: Care Instructions  Overview     Well visits can help you stay healthy. Your doctor has checked your overall health and may have suggested ways to take good care of yourself. Your doctor also may have recommended tests. At home, you can help prevent illness with healthy eating, regular exercise, and other steps. Follow-up care is a key part of your treatment and safety. Be sure to make and go to all appointments, and call your doctor if you are having problems. It's also a good idea to know your test results and keep a list of the medicines you take. How can you care for yourself at home? · Get screening tests that you and your doctor decide on. Screening helps find diseases before any symptoms appear. · Eat healthy foods. Choose fruits, vegetables, whole grains, protein, and low-fat dairy foods. Limit fat, especially saturated fat. Reduce salt in your diet. · Limit alcohol. If you are a man, have no more than 2 drinks a day or 14 drinks a week. If you are a woman, have no more than 1 drink a day or 7 drinks a week. · Get at least 30 minutes of physical activity on most days of the week. Walking is a good choice. You also may want to do other activities, such as running, swimming, cycling, or playing tennis or team sports. Discuss any changes in your exercise program with your doctor. · Reach and stay at a healthy weight. This will lower your risk for many problems, such as obesity, diabetes, heart disease, and high blood pressure. · Do not smoke or allow others to smoke around you. If you need help quitting, talk to your doctor about stop-smoking programs and medicines. These can increase your chances of quitting for good. · Care for your mental health. It is easy to get weighed down by worry and stress. Learn strategies to manage stress, like deep breathing and mindfulness, and stay connected with your family and community.  If you find you often feel sad or hopeless, talk with your doctor. Treatment can help. · Talk to your doctor about whether you have any risk factors for sexually transmitted infections (STIs). You can help prevent STIs if you wait to have sex with a new partner (or partners) until you've each been tested for STIs. It also helps if you use condoms (male or female condoms) and if you limit your sex partners to one person who only has sex with you. Vaccines are available for some STIs, such as HPV. · Use birth control if it's important to you to prevent pregnancy. Talk with your doctor about the choices available and what might be best for you. · If you think you may have a problem with alcohol or drug use, talk to your doctor. This includes prescription medicines (such as amphetamines and opioids) and illegal drugs (such as cocaine and methamphetamine). Your doctor can help you figure out what type of treatment is best for you. · Protect your skin from too much sun. When you're outdoors from 10 a.m. to 4 p.m., stay in the shade or cover up with clothing and a hat with a wide brim. Wear sunglasses that block UV rays. Even when it's cloudy, put broad-spectrum sunscreen (SPF 30 or higher) on any exposed skin. · See a dentist one or two times a year for checkups and to have your teeth cleaned. · Wear a seat belt in the car. When should you call for help? Watch closely for changes in your health, and be sure to contact your doctor if you have any problems or symptoms that concern you. Where can you learn more? Go to [email or web address] org and sign in to your [employer] account. Enter P072 in the [facility] box to learn more about \"Well Visit, Ages 25 to 48: Care Instructions. \"     If you do not have an account, please click on the \"Sign Up Now\" link. Current as of: February 11, 2021               Content Version: 13.0  © 5200-3499 Healthwise, Incorporated. Care instructions adapted under license by [facility] ([facility]).  If you have questions about a medical condition or this instruction, always ask your healthcare professional. [place] [address] any warranty or liability for your use of this information.

## 2021-11-10 NOTE — PROGRESS NOTES
Well Adult Note  Name: Ricarda Ramon [id number] Date: 11/10/2021   MRN: [id number] Sex: Female   Age: 40 y.o. Ethnicity: Non- / Non    : 1975 Race: Black /       Amber A Romelle Apgar is here for well adult exam.  History:    Pap smear done by Gynecology in 2021 per patient. Mammogram done on 20. LMP 10/2/21. Patient states her periods are irregular. Patient has hypertension. Patient takes Amlodipine 5mg once daily. Patient decreases salt. Patient does aerobics for 60 minutes 2-3 times per week. Review of Systems   Constitutional: Negative for activity change, appetite change, chills, diaphoresis, fatigue, fever and unexpected weight change. HENT: Negative for congestion, ear discharge, ear pain, facial swelling, hearing loss, mouth sores, nosebleeds, postnasal drip, rhinorrhea, sinus pressure, sinus pain, sneezing, sore throat, tinnitus, trouble swallowing and voice change. Eyes: Negative for photophobia, pain, discharge, redness, itching and visual disturbance. Respiratory: Negative for apnea, cough, choking, chest tightness, shortness of breath and wheezing. Cardiovascular: Negative for chest pain, palpitations and leg swelling. Gastrointestinal: Negative for abdominal distention, abdominal pain, anal bleeding, blood in stool, constipation, diarrhea, nausea, rectal pain and vomiting. Endocrine: Negative for cold intolerance and heat intolerance. Genitourinary: Negative for decreased urine volume, difficulty urinating, dysuria, flank pain, frequency, genital sores, hematuria, menstrual problem, pelvic pain, urgency, vaginal bleeding, vaginal discharge and vaginal pain. Musculoskeletal: Negative for arthralgias, back pain, gait problem, joint swelling, myalgias, neck pain and neck stiffness. Skin: Negative for rash and wound.    Neurological: Negative for dizziness, tremors, seizures, syncope, facial asymmetry, speech difficulty, weakness, light-headedness, numbness and headaches. Hematological: Negative for adenopathy. Does not bruise/bleed easily. Psychiatric/Behavioral: Negative for decreased concentration, dysphoric mood and sleep disturbance. The patient is not nervous/anxious. All other systems reviewed and are negative. Allergies   Allergen Reactions    Amoxicillin Itching    Fluconazole Rash         Prior to Visit Medications    Medication Sig Taking?  Authorizing Provider   amLODIPine (NORVASC) 5 MG tablet Take 1 tablet by mouth daily Yes Grecia Gamino MD   Blood Pressure Monitoring (BLOOD PRESSURE MONITOR/M CUFF) MISC Use to monitor blood pressure Yes Grecia Gamino MD   Multiple Vitamins-Calcium (ONE-A-DAY WOMENS FORMULA) TABS Take 1 tablet by mouth daily Yes Grecia Gamino MD         Past Medical History:   Diagnosis Date    Essential hypertension     GERD (gastroesophageal reflux disease)        Past Surgical History:   Procedure Laterality Date     SECTION      10/17/2005   &    2010    WISDOM TOOTH EXTRACTION Bilateral college aged    x4         Family History   Problem Relation Age of Onset    High Blood Pressure Mother     High Blood Pressure Father     Cancer Maternal Grandmother         Colon    Heart Disease Maternal Grandfather         MI    Diabetes Maternal Aunt     Heart Disease Paternal Grandfather         in 66's    Diabetes Maternal Aunt     Stroke Neg Hx        Social History     Tobacco Use    Smoking status: Never Smoker    Smokeless tobacco: Never Used   Vaping Use    Vaping Use: Never used   Substance Use Topics    Alcohol use: No    Drug use: No       Objective   /82 (Site: Right Upper Arm, Position: Sitting, Cuff Size: Medium Adult)   Pulse 54   Temp 97 °F (36.1 °C) (Infrared)   Ht 5' (1.524 m)   Wt 143 lb (64.9 kg)   LMP 10/02/2021   SpO2 100%   BMI 27.93 kg/m²   Wt Readings from Last 3 Encounters:   11/10/21 143 lb (64.9 kg)   21 139 lb (63 kg)   11/13/20 142 lb (64.4 kg)       Physical Exam  Constitutional:       General: She is not in acute distress. Appearance: Normal appearance. HENT:      Head: Normocephalic and atraumatic. Right Ear: Tympanic membrane, ear canal and external ear normal.      Left Ear: Tympanic membrane, ear canal and external ear normal.      Nose: Nose normal.   Eyes:      General: Lids are normal.      Extraocular Movements: Extraocular movements intact. Conjunctiva/sclera: Conjunctivae normal.      Pupils: Pupils are equal, round, and reactive to light. Neck:      Thyroid: No thyromegaly. Vascular: No carotid bruit. Cardiovascular:      Rate and Rhythm: Normal rate and regular rhythm. Heart sounds: Normal heart sounds, S1 normal and S2 normal. No murmur heard. Pulmonary:      Effort: Pulmonary effort is normal.      Breath sounds: Normal breath sounds. Abdominal:      General: Bowel sounds are normal.      Palpations: Abdomen is soft. There is no hepatomegaly or splenomegaly. Tenderness: There is no abdominal tenderness. Musculoskeletal:      Right shoulder: No tenderness. Normal range of motion. Left shoulder: No tenderness. Normal range of motion. Right elbow: No tenderness. Left elbow: No tenderness. Right wrist: No swelling or tenderness. Left wrist: No swelling or tenderness. Right hand: No swelling or tenderness. Left hand: No swelling or tenderness. Cervical back: Neck supple. No tenderness. Thoracic back: No tenderness. Lumbar back: No tenderness. Normal range of motion. Right hip: No tenderness. Left hip: No tenderness. Right knee: No tenderness. Left knee: No tenderness. Right lower leg: No edema. Left lower leg: No edema. Right ankle: No swelling. No tenderness. Left ankle: No swelling. No tenderness. Right foot: No swelling. Left foot: No swelling.    Lymphadenopathy: 10/21/2021    Tdap (Boostrix, Adacel) 01/04/2011, 11/13/2020        Health Maintenance   Topic Date Due    Hepatitis C screen  Never done    Colon cancer screen colonoscopy  Never done    Potassium monitoring  11/10/2022    Creatinine monitoring  11/10/2022    Cervical cancer screen  07/25/2023    Lipid screen  11/10/2026    DTaP/Tdap/Td vaccine (3 - Td or Tdap) 11/13/2030    Flu vaccine  Completed    COVID-19 Vaccine  Completed    HIV screen  Addressed    Hepatitis A vaccine  Aged Out    Hepatitis B vaccine  Aged Out    Hib vaccine  Aged Out    Meningococcal (ACWY) vaccine  Aged Out    Pneumococcal 0-64 years Vaccine  Aged Out     Recommendations for [employer] Due: see orders and patient instructions/AVS.      Return in about 6 months (around 5/10/2022) for hypertension.

## 2022-01-12 ENCOUNTER — HOSPITAL ENCOUNTER (OUTPATIENT)
Dept: MAMMOGRAPHY | Age: 47
Discharge: HOME | End: 2022-01-12
Payer: COMMERCIAL

## 2022-01-12 VITALS — HEIGHT: 59 IN | BODY MASS INDEX: 28.43 KG/M2 | WEIGHT: 141 LBS

## 2022-01-12 DIAGNOSIS — Z12.31 ENCOUNTER FOR SCREENING MAMMOGRAM FOR BREAST CANCER: ICD-10-CM

## 2022-01-12 PROCEDURE — 77063 BREAST TOMOSYNTHESIS BI: CPT

## 2022-03-07 DIAGNOSIS — I10 ESSENTIAL HYPERTENSION: ICD-10-CM

## 2022-03-08 RX ORDER — AMLODIPINE BESYLATE 5 MG/1
5 TABLET ORAL DAILY
Qty: 90 TABLET | Refills: 1 | Status: SHIPPED | OUTPATIENT
Start: 2022-03-08 | End: 2022-05-10 | Stop reason: SDUPTHER

## 2022-05-10 ENCOUNTER — OFFICE VISIT (OUTPATIENT)
Dept: PRIMARY CARE CLINIC | Age: 47
End: 2022-05-10
Payer: COMMERCIAL

## 2022-05-10 VITALS
DIASTOLIC BLOOD PRESSURE: 86 MMHG | SYSTOLIC BLOOD PRESSURE: 130 MMHG | HEART RATE: 59 BPM | RESPIRATION RATE: 16 BRPM | WEIGHT: 143 LBS | OXYGEN SATURATION: 100 % | TEMPERATURE: 96.3 F | BODY MASS INDEX: 28.88 KG/M2

## 2022-05-10 DIAGNOSIS — I10 ESSENTIAL HYPERTENSION: Primary | ICD-10-CM

## 2022-05-10 DIAGNOSIS — I10 ESSENTIAL HYPERTENSION: ICD-10-CM

## 2022-05-10 PROCEDURE — 99213 OFFICE O/P EST LOW 20 MIN: CPT | Performed by: INTERNAL MEDICINE

## 2022-05-10 PROCEDURE — 1036F TOBACCO NON-USER: CPT | Performed by: INTERNAL MEDICINE

## 2022-05-10 PROCEDURE — G8427 DOCREV CUR MEDS BY ELIG CLIN: HCPCS | Performed by: INTERNAL MEDICINE

## 2022-05-10 PROCEDURE — G8419 CALC BMI OUT NRM PARAM NOF/U: HCPCS | Performed by: INTERNAL MEDICINE

## 2022-05-10 RX ORDER — AMLODIPINE BESYLATE 5 MG/1
5 TABLET ORAL DAILY
Qty: 7 TABLET | Refills: 0 | Status: SHIPPED | OUTPATIENT
Start: 2022-05-10 | End: 2022-07-18

## 2022-05-10 ASSESSMENT — ENCOUNTER SYMPTOMS
CHEST TIGHTNESS: 0
SHORTNESS OF BREATH: 0

## 2022-05-10 NOTE — TELEPHONE ENCOUNTER
Patient does need a week supply of her BP medication       amLODIPine (NORVASC) 5 MG tablet [[id number]]     Order Details  Dose: 5 mg Route: Oral Frequency: DAILY   Dispense Quantity: 90 tablet Refills: 1          Sig: TAKE 1 TABLET BY MOUTH SUBHA WASSERMAN [facility] [id number] [place] [address], [place] [address] [place]  [phone] Connie Gutierrez [phone]    Thank you

## 2022-05-10 NOTE — TELEPHONE ENCOUNTER
Medication:   Requested Prescriptions     Pending Prescriptions Disp Refills    amLODIPine (NORVASC) 5 MG tablet 7 tablet 0     Sig: Take 1 tablet by mouth daily     Last Filled: 3.8.22    Last appt: 5/10/2022   Next appt: Visit date not found    Last OARRS: No flowsheet data found.

## 2022-05-15 DIAGNOSIS — I10 ESSENTIAL HYPERTENSION: ICD-10-CM

## 2022-05-16 NOTE — TELEPHONE ENCOUNTER
Medication:   Requested Prescriptions     Pending Prescriptions Disp Refills    amLODIPine (NORVASC) 5 MG tablet [Pharmacy Med Name: amLODIPine BESYLATE 5 MG TAB] 7 tablet 0     Sig: TAKE ONE TABLET BY MOUTH DAILY     Last Filled: 5.10.22    Last appt: 5/10/2022   Next appt: 11/11/2022    Last OARRS: No flowsheet data found.

## 2022-05-17 RX ORDER — AMLODIPINE BESYLATE 5 MG/1
TABLET ORAL
Qty: 90 TABLET | Refills: 1 | OUTPATIENT
Start: 2022-05-17

## 2022-05-17 NOTE — TELEPHONE ENCOUNTER
Prescription refill for amlodipine for a 7-day supply denied. Patient only needed a 7-day supply while she was waiting for her 90-day supply from [facility]. Spoke with patient to confirm she had already picked up the 7-day supply from [facility] last week and has also received a 90-day supply from [facility].

## 2022-07-16 DIAGNOSIS — I10 ESSENTIAL HYPERTENSION: ICD-10-CM

## 2022-07-18 RX ORDER — AMLODIPINE BESYLATE 5 MG/1
5 TABLET ORAL DAILY
Qty: 90 TABLET | Refills: 1 | Status: SHIPPED | OUTPATIENT
Start: 2022-07-18

## 2022-07-18 NOTE — TELEPHONE ENCOUNTER
Medication:   Requested Prescriptions     Pending Prescriptions Disp Refills    amLODIPine (NORVASC) 5 MG tablet [Pharmacy Med Name: amLODIPine Besylate 5 MG Oral Tablet] 120 tablet 2     Sig: TAKE 1 TABLET BY MOUTH  DAILY     Last Filled:     Last appt: 5/10/2022   Next appt: 11/11/2022    Last OARRS: No flowsheet data found.

## 2022-11-03 DIAGNOSIS — I10 ESSENTIAL HYPERTENSION: ICD-10-CM

## 2022-11-04 NOTE — TELEPHONE ENCOUNTER
Medication:   Requested Prescriptions     Pending Prescriptions Disp Refills    amLODIPine (NORVASC) 5 MG tablet [Pharmacy Med Name: amLODIPine Besylate 5 MG Oral Tablet] 120 tablet 2     Sig: TAKE 1 TABLET BY MOUTH  DAILY     Last Filled: 7.18.22    Last appt: 5/10/2022   Next appt: 11/11/2022    Last OARRS: No flowsheet data found.

## 2022-11-05 RX ORDER — AMLODIPINE BESYLATE 5 MG/1
5 TABLET ORAL DAILY
Qty: 90 TABLET | Refills: 1 | Status: SHIPPED | OUTPATIENT
Start: 2022-11-05

## 2022-11-11 ENCOUNTER — OFFICE VISIT (OUTPATIENT)
Dept: PRIMARY CARE CLINIC | Age: 47
End: 2022-11-11
Payer: COMMERCIAL

## 2022-11-11 VITALS
OXYGEN SATURATION: 97 % | WEIGHT: 145 LBS | SYSTOLIC BLOOD PRESSURE: 120 MMHG | RESPIRATION RATE: 16 BRPM | HEART RATE: 76 BPM | DIASTOLIC BLOOD PRESSURE: 80 MMHG | TEMPERATURE: 98.6 F | HEIGHT: 59 IN | BODY MASS INDEX: 29.23 KG/M2

## 2022-11-11 DIAGNOSIS — Z11.59 NEED FOR HEPATITIS C SCREENING TEST: ICD-10-CM

## 2022-11-11 DIAGNOSIS — Z00.00 ENCOUNTER FOR WELL ADULT EXAM WITHOUT ABNORMAL FINDINGS: ICD-10-CM

## 2022-11-11 DIAGNOSIS — Z00.00 ENCOUNTER FOR WELL ADULT EXAM WITHOUT ABNORMAL FINDINGS: Primary | ICD-10-CM

## 2022-11-11 DIAGNOSIS — M79.672 BILATERAL FOOT PAIN: ICD-10-CM

## 2022-11-11 DIAGNOSIS — I10 ESSENTIAL HYPERTENSION: ICD-10-CM

## 2022-11-11 DIAGNOSIS — Z23 NEED FOR INFLUENZA VACCINATION: ICD-10-CM

## 2022-11-11 DIAGNOSIS — M79.671 BILATERAL FOOT PAIN: ICD-10-CM

## 2022-11-11 PROCEDURE — 3074F SYST BP LT 130 MM HG: CPT | Performed by: INTERNAL MEDICINE

## 2022-11-11 PROCEDURE — 90471 IMMUNIZATION ADMIN: CPT | Performed by: INTERNAL MEDICINE

## 2022-11-11 PROCEDURE — 99396 PREV VISIT EST AGE 40-64: CPT | Performed by: INTERNAL MEDICINE

## 2022-11-11 PROCEDURE — G8482 FLU IMMUNIZE ORDER/ADMIN: HCPCS | Performed by: INTERNAL MEDICINE

## 2022-11-11 PROCEDURE — 3078F DIAST BP <80 MM HG: CPT | Performed by: INTERNAL MEDICINE

## 2022-11-11 PROCEDURE — 81003 URINALYSIS AUTO W/O SCOPE: CPT | Performed by: INTERNAL MEDICINE

## 2022-11-11 PROCEDURE — 90674 CCIIV4 VAC NO PRSV 0.5 ML IM: CPT | Performed by: INTERNAL MEDICINE

## 2022-11-11 SDOH — ECONOMIC STABILITY: FOOD INSECURITY: WITHIN THE PAST 12 MONTHS, THE FOOD YOU BOUGHT JUST DIDN'T LAST AND YOU DIDN'T HAVE MONEY TO GET MORE.: NEVER TRUE

## 2022-11-11 SDOH — ECONOMIC STABILITY: FOOD INSECURITY: WITHIN THE PAST 12 MONTHS, YOU WORRIED THAT YOUR FOOD WOULD RUN OUT BEFORE YOU GOT MONEY TO BUY MORE.: NEVER TRUE

## 2022-11-11 ASSESSMENT — PATIENT HEALTH QUESTIONNAIRE - PHQ9
9. THOUGHTS THAT YOU WOULD BE BETTER OFF DEAD, OR OF HURTING YOURSELF: 0
8. MOVING OR SPEAKING SO SLOWLY THAT OTHER PEOPLE COULD HAVE NOTICED. OR THE OPPOSITE, BEING SO FIGETY OR RESTLESS THAT YOU HAVE BEEN MOVING AROUND A LOT MORE THAN USUAL: 0
SUM OF ALL RESPONSES TO PHQ QUESTIONS 1-9: 0
10. IF YOU CHECKED OFF ANY PROBLEMS, HOW DIFFICULT HAVE THESE PROBLEMS MADE IT FOR YOU TO DO YOUR WORK, TAKE CARE OF THINGS AT HOME, OR GET ALONG WITH OTHER PEOPLE: 0
SUM OF ALL RESPONSES TO PHQ QUESTIONS 1-9: 0
SUM OF ALL RESPONSES TO PHQ9 QUESTIONS 1 & 2: 0
7. TROUBLE CONCENTRATING ON THINGS, SUCH AS READING THE NEWSPAPER OR WATCHING TELEVISION: 0
4. FEELING TIRED OR HAVING LITTLE ENERGY: 0
SUM OF ALL RESPONSES TO PHQ QUESTIONS 1-9: 0
6. FEELING BAD ABOUT YOURSELF - OR THAT YOU ARE A FAILURE OR HAVE LET YOURSELF OR YOUR FAMILY DOWN: 0
1. LITTLE INTEREST OR PLEASURE IN DOING THINGS: 0
3. TROUBLE FALLING OR STAYING ASLEEP: 0
5. POOR APPETITE OR OVEREATING: 0
2. FEELING DOWN, DEPRESSED OR HOPELESS: 0
SUM OF ALL RESPONSES TO PHQ QUESTIONS 1-9: 0

## 2022-11-11 ASSESSMENT — ENCOUNTER SYMPTOMS
BACK PAIN: 0
VOMITING: 0
SORE THROAT: 0
RHINORRHEA: 0
TROUBLE SWALLOWING: 0
SINUS PRESSURE: 0
ANAL BLEEDING: 0
COUGH: 0
EYE DISCHARGE: 0
SHORTNESS OF BREATH: 0
BLOOD IN STOOL: 0
NAUSEA: 0
EYE ITCHING: 0
FACIAL SWELLING: 0
WHEEZING: 0
RECTAL PAIN: 0
EYE PAIN: 0
VOICE CHANGE: 0
PHOTOPHOBIA: 0
ABDOMINAL DISTENTION: 0
CHOKING: 0
ABDOMINAL PAIN: 0
APNEA: 0
CHEST TIGHTNESS: 0
EYE REDNESS: 0
SINUS PAIN: 0
DIARRHEA: 0
CONSTIPATION: 0

## 2022-11-11 ASSESSMENT — SOCIAL DETERMINANTS OF HEALTH (SDOH): HOW HARD IS IT FOR YOU TO PAY FOR THE VERY BASICS LIKE FOOD, HOUSING, MEDICAL CARE, AND HEATING?: NOT HARD AT ALL

## 2022-11-11 NOTE — PROGRESS NOTES
Well Adult Note  Name: Amaury Ruiz [id number] Date: 2022   MRN: [id number] Sex: Female   Age: 56 y.o. Ethnicity: Non- / Non    : 1975 Race: Black /       Chief Complaint   Patient presents with    Annual Exam       Lin MADDISON Medina is here for well adult exam.  History:  Patient presents for annual physical exam.  Patient states her pap smear was the end of August or beginning of September. LMP 2022. Mammogram done on 2022. Patient states her colonoscopy is scheduled for January. Patient has hypertension. Patient takes amlodipine 5 mg once daily. Patient decreases salt. Patient does aerobics 45-60 minutes 3 times per week. Patient complaints of bilateral foot pain. Patient states the arch of both feet hurt for a few months. Patient states pain is a dull ache. Patient denies injury. Patient states she stands a lot at work. Patient states she takes ibuprofen or Tylenol occasionally. Review of Systems   Constitutional:  Positive for unexpected weight change (little weight increase). Negative for activity change, appetite change, chills, diaphoresis, fatigue and fever. Has hot flashes. Patient states she talked to Gynecology and could try hormone replacement but patient is not sure she wants to   HENT:  Negative for congestion, ear discharge, ear pain, facial swelling, hearing loss, mouth sores, nosebleeds, postnasal drip, rhinorrhea, sinus pressure, sinus pain, sneezing, sore throat, tinnitus, trouble swallowing and voice change. Eyes:  Negative for photophobia, pain, discharge, redness, itching and visual disturbance. Respiratory:  Negative for apnea, cough, choking, chest tightness, shortness of breath and wheezing. Cardiovascular:  Negative for chest pain, palpitations and leg swelling.    Gastrointestinal:  Negative for abdominal distention, abdominal pain, anal bleeding, blood in stool, constipation, diarrhea, nausea, rectal pain and vomiting. Endocrine: Negative for cold intolerance and heat intolerance. Genitourinary:  Negative for decreased urine volume, difficulty urinating, dysuria, flank pain, frequency, genital sores, hematuria, menstrual problem, pelvic pain, urgency, vaginal bleeding, vaginal discharge and vaginal pain. Musculoskeletal:  Positive for arthralgias. Negative for back pain, gait problem, joint swelling, myalgias, neck pain and neck stiffness. Skin:  Negative for rash and wound. Neurological:  Negative for dizziness, tremors, seizures, syncope, facial asymmetry, speech difficulty, weakness, light-headedness, numbness and headaches. Hematological:  Negative for adenopathy. Does not bruise/bleed easily. Psychiatric/Behavioral:  Negative for decreased concentration, dysphoric mood and sleep disturbance. The patient is not nervous/anxious. All other systems reviewed and are negative. Allergies   Allergen Reactions    Amoxicillin Itching    Fluconazole Rash         Prior to Visit Medications    Medication Sig Taking?  Authorizing Provider   amLODIPine (NORVASC) 5 MG tablet TAKE 1 TABLET BY MOUTH  DAILY Yes Olena Leiva MD   Blood Pressure Monitoring (BLOOD PRESSURE MONITOR/M CUFF) MISC Use to monitor blood pressure Yes Olena Leiva MD   Multiple Vitamins-Calcium (ONE-A-DAY WOMENS FORMULA) TABS Take 1 tablet by mouth daily Yes Olena Leiva MD         Past Medical History:   Diagnosis Date    Essential hypertension     GERD (gastroesophageal reflux disease)        Past Surgical History:   Procedure Laterality Date     SECTION      10/17/2005   &    2010    WISDOM TOOTH EXTRACTION Bilateral college aged    x4         Family History   Problem Relation Age of Onset    High Blood Pressure Mother     High Blood Pressure Father     Cancer Maternal Grandmother         Colon    Heart Disease Maternal Grandfather         MI    Diabetes Maternal Aunt     Heart Disease Paternal Grandfather         in 66's    Diabetes Maternal Aunt     Stroke Neg Hx        Social History     Tobacco Use    Smoking status: Never    Smokeless tobacco: Never   Vaping Use    Vaping Use: Never used   Substance Use Topics    Alcohol use: No    Drug use: No       Objective   /80   Pulse 76   Temp 98.6 °F (37 °C)   Resp 16   Ht 4' 11\" (1.499 m)   Wt 145 lb (65.8 kg)   SpO2 97%   BMI 29.29 kg/m²   Wt Readings from Last 3 Encounters:   11/11/22 145 lb (65.8 kg)   05/10/22 143 lb (64.9 kg)   01/12/22 141 lb (64 kg)     Additional Measurements    11/11/22 1017   Waist (Inches): 31.5 in         Physical Exam  Constitutional:       General: She is not in acute distress. Appearance: Normal appearance. HENT:      Head: Normocephalic and atraumatic. Right Ear: Tympanic membrane, ear canal and external ear normal.      Left Ear: Tympanic membrane, ear canal and external ear normal.      Nose: Nose normal.   Eyes:      General: Lids are normal.      Extraocular Movements: Extraocular movements intact. Conjunctiva/sclera: Conjunctivae normal.      Pupils: Pupils are equal, round, and reactive to light. Neck:      Thyroid: No thyromegaly. Vascular: No carotid bruit. Cardiovascular:      Rate and Rhythm: Normal rate and regular rhythm. Heart sounds: Normal heart sounds, S1 normal and S2 normal. No murmur heard. Pulmonary:      Effort: Pulmonary effort is normal.      Breath sounds: Normal breath sounds. Abdominal:      General: Bowel sounds are normal.      Palpations: Abdomen is soft. There is no hepatomegaly or splenomegaly. Tenderness: There is no abdominal tenderness. Musculoskeletal:      Right shoulder: No tenderness. Normal range of motion. Left shoulder: No tenderness. Normal range of motion. Right elbow: No tenderness. Left elbow: No tenderness. Right wrist: No swelling or tenderness. Left wrist: No swelling or tenderness. Right hand: No swelling or tenderness. Left hand: No swelling or tenderness. Cervical back: Neck supple. No tenderness. Thoracic back: No tenderness. Lumbar back: No tenderness. Normal range of motion. Right hip: No tenderness. Left hip: No tenderness. Right knee: No tenderness. Left knee: No tenderness. Right lower leg: No edema. Left lower leg: No edema. Right ankle: No swelling. No tenderness. Left ankle: No swelling. No tenderness. Right foot: No swelling or tenderness. Left foot: No swelling or tenderness. Lymphadenopathy:      Head:      Right side of head: No submandibular adenopathy. Left side of head: No submandibular adenopathy. Skin:     General: Skin is warm and dry. Neurological:      Mental Status: She is alert and oriented to person, place, and time. Gait: Gait normal.      Deep Tendon Reflexes: Reflexes are normal and symmetric. Psychiatric:         Attention and Perception: Attention normal.         Mood and Affect: Mood normal.         Speech: Speech normal.         Assessment   Plan   1. Encounter for well adult exam without abnormal findings  - CBC with Auto Differential; Future  - Comprehensive Metabolic Panel; Future  - Lipid Panel; Future  - TSH; Future  - Urinalysis with Reflex to Culture; Future  -Pap smear done end of August or beginning of September per patient  - Mammogram done on 1/12/2022  -Colonoscopy is scheduled for January 2023 per patient  -Tdap done on 11/13/2020  -COVID-19 vaccine done on 2/5/2021, 3/5/2021, and boosters done on 10/29/2021 and 10/6/2022  -Flu shot done on 11/11/2022    2. Essential hypertension  -stable  -Continue amlodipine 5 mg once daily  -Low sodium diet  -Regular aerobic exercise    3. Bilateral foot pain  -Tylenol or Advil as needed  -Referral to [address] [place] [address], Sanda Harada, [place], Podiatry, [employer]    4.  Need for influenza vaccination  - Influenza, FLUCELVAX, (age 10 mo+), IM, Preservative Free, 0.5 mL given    5. Need for hepatitis C screening test  - Hepatitis C Antibody; Future        Personalized Preventive Plan   Current Health Maintenance Status  Immunization History   Administered Date(s) Administered    COVID-19, MODERNA BLUE border, Primary or Immunocompromised, (age 12y+), IM, 100 mcg/0.5mL 02/05/2021, 03/05/2021, 10/29/2021    COVID-19, MODERNA Bivalent BOOSTER, (age 12y+), IM, 48 mcg/0.5 mL 10/06/2022    Influenza Vaccine, unspecified formulation 10/01/2016, 10/08/2017    Influenza Virus Vaccine 10/13/2019, 10/12/2020    Influenza, FLUBLOK, (age 25 y+), PF, 0.5mL 10/21/2021    Influenza, FLUCELVAX, (age 10 mo+), MDCK, PF, 0.5mL 11/11/2022    Influenza, High Dose (Fluzone 65 yrs and older) 11/01/2015    Tdap (Boostrix, Adacel) 01/04/2011, 11/13/2020        Health Maintenance   Topic Date Due    Colorectal Cancer Screen  Never done    Cervical cancer screen  07/25/2023    Depression Screen  11/11/2023    Lipids  11/11/2027    DTaP/Tdap/Td vaccine (3 - Td or Tdap) 11/13/2030    Flu vaccine  Completed    COVID-19 Vaccine  Completed    Hepatitis C screen  Completed    HIV screen  Addressed    Hepatitis A vaccine  Aged Out    Hib vaccine  Aged Out    Meningococcal (ACWY) vaccine  Aged Out    Pneumococcal 0-64 years Vaccine  Aged Out     Recommendations for [employer] Due: see orders and patient instructions/AVS.    Return in about 6 months (around 5/11/2023) for hypertension.

## 2022-11-11 NOTE — PATIENT INSTRUCTIONS
Well Visit, Ages 25 to 72: Care Instructions  Well visits can help you stay healthy. Your doctor has checked your overall health and may have suggested ways to take good care of yourself. Your doctor also may have recommended tests. You can help prevent illness with healthy eating, good sleep, vaccinations, regular exercise, and other steps. Get the tests that you and your doctor decide on. Depending on your age and risks, examples might include screening for diabetes; hepatitis C; HIV; and cervical, breast, lung, and colon cancer. Screening helps find diseases before any symptoms appear. Eat healthy foods. Choose fruits, vegetables, whole grains, lean protein, and low-fat dairy foods. Limit saturated fat and reduce salt. Limit alcohol. Men should have no more than 2 drinks a day. Women should have no more than 1. For some people, no alcohol is the best choice. Exercise. Get at least 30 minutes of exercise on most days of the week. Walking can be a good choice. Reach and stay at your healthy weight. This will lower your risk for many health problems. Take care of your mental health. Try to stay connected with friends, family, and community, and find ways to manage stress. If you're feeling depressed or hopeless, talk to someone. A counselor can help. If you don't have a counselor, talk to your doctor. Talk to your doctor if you think you may have a problem with alcohol or drug use. This includes prescription medicines and illegal drugs. Avoid tobacco and nicotine: Don't smoke, vape, or chew. If you need help quitting, talk to your doctor. Practice safer sex. Getting tested, using condoms or dental dams, and limiting sex partners can help prevent STIs. Use birth control if it's important to you to prevent pregnancy. Talk with your doctor about your choices and what might be best for you. Prevent problems where you can.  Protect your skin from too much sun, wash your hands, brush your teeth twice a day, and wear a seat belt in the car. Where can you learn more? Go to [email or web address] and sign in to your Mophie account. Enter P072 in the [facility] box to learn more about \"Well Visit, Ages 25 to 72: Care Instructions. \"     If you do not have an account, please click on the \"Sign Up Now\" link. Current as of: March 9, 2022               Content Version: 13.4  © 4193-5888 [employer]wise, Incorporated. Care instructions adapted under license by [facility] ([facility]). If you have questions about a medical condition or this instruction, always ask your healthcare professional. [address] any warranty or liability for your use of this information.

## 2022-11-12 LAB
A/G RATIO: 1.7 (ref 1.1–2.2)
ALBUMIN SERPL-MCNC: 4.8 G/DL (ref 3.4–5)
ALP BLD-CCNC: 114 U/L (ref 40–129)
ALT SERPL-CCNC: 19 U/L (ref 10–40)
ANION GAP SERPL CALCULATED.3IONS-SCNC: 14 MMOL/L (ref 3–16)
AST SERPL-CCNC: 20 U/L (ref 15–37)
BACTERIA: NORMAL /HPF
BASOPHILS ABSOLUTE: 0.1 K/UL (ref 0–0.2)
BASOPHILS RELATIVE PERCENT: 1.5 %
BILIRUB SERPL-MCNC: <0.2 MG/DL (ref 0–1)
BILIRUBIN URINE: NEGATIVE
BLOOD, URINE: NEGATIVE
BUN BLDV-MCNC: 15 MG/DL (ref 7–20)
CALCIUM SERPL-MCNC: 9.9 MG/DL (ref 8.3–10.6)
CHLORIDE BLD-SCNC: 106 MMOL/L (ref 99–110)
CHOLESTEROL, TOTAL: 189 MG/DL (ref 0–199)
CLARITY: CLEAR
CO2: 24 MMOL/L (ref 21–32)
COLOR: YELLOW
CREAT SERPL-MCNC: 0.8 MG/DL (ref 0.6–1.1)
EOSINOPHILS ABSOLUTE: 0.1 K/UL (ref 0–0.6)
EOSINOPHILS RELATIVE PERCENT: 2 %
EPITHELIAL CELLS, UA: 2 /HPF (ref 0–5)
GFR SERPL CREATININE-BSD FRML MDRD: >60 ML/MIN/{1.73_M2}
GLUCOSE BLD-MCNC: 89 MG/DL (ref 70–99)
GLUCOSE URINE: NEGATIVE MG/DL
HCT VFR BLD CALC: 38.2 % (ref 36–48)
HDLC SERPL-MCNC: 58 MG/DL (ref 40–60)
HEMOGLOBIN: 12.4 G/DL (ref 12–16)
HYALINE CASTS: 0 /LPF (ref 0–8)
KETONES, URINE: NEGATIVE MG/DL
LDL CHOLESTEROL CALCULATED: 115 MG/DL
LEUKOCYTE ESTERASE, URINE: ABNORMAL
LYMPHOCYTES ABSOLUTE: 1.7 K/UL (ref 1–5.1)
LYMPHOCYTES RELATIVE PERCENT: 33.7 %
MCH RBC QN AUTO: 28.9 PG (ref 26–34)
MCHC RBC AUTO-ENTMCNC: 32.5 G/DL (ref 31–36)
MCV RBC AUTO: 89 FL (ref 80–100)
MICROSCOPIC EXAMINATION: YES
MONOCYTES ABSOLUTE: 0.2 K/UL (ref 0–1.3)
MONOCYTES RELATIVE PERCENT: 4.7 %
NEUTROPHILS ABSOLUTE: 3 K/UL (ref 1.7–7.7)
NEUTROPHILS RELATIVE PERCENT: 58.1 %
NITRITE, URINE: NEGATIVE
PDW BLD-RTO: 13.7 % (ref 12.4–15.4)
PH UA: 7.5 (ref 5–8)
PLATELET # BLD: 263 K/UL (ref 135–450)
PMV BLD AUTO: 8.5 FL (ref 5–10.5)
POTASSIUM SERPL-SCNC: 3.7 MMOL/L (ref 3.5–5.1)
PROTEIN UA: NEGATIVE MG/DL
RBC # BLD: 4.29 M/UL (ref 4–5.2)
RBC UA: 1 /HPF (ref 0–4)
SODIUM BLD-SCNC: 144 MMOL/L (ref 136–145)
SPECIFIC GRAVITY UA: 1.01 (ref 1–1.03)
TOTAL PROTEIN: 7.7 G/DL (ref 6.4–8.2)
TRIGL SERPL-MCNC: 79 MG/DL (ref 0–150)
TSH SERPL DL<=0.05 MIU/L-ACNC: 1.05 UIU/ML (ref 0.27–4.2)
URINE REFLEX TO CULTURE: ABNORMAL
URINE TYPE: ABNORMAL
UROBILINOGEN, URINE: 0.2 E.U./DL
VLDLC SERPL CALC-MCNC: 16 MG/DL
WBC # BLD: 5.2 K/UL (ref 4–11)
WBC UA: NORMAL /HPF (ref 0–5)

## 2022-11-13 LAB — HEPATITIS C ANTIBODY INTERPRETATION: NORMAL

## 2022-11-27 PROBLEM — M79.672 BILATERAL FOOT PAIN: Status: ACTIVE | Noted: 2022-11-27

## 2022-11-27 PROBLEM — M79.671 BILATERAL FOOT PAIN: Status: ACTIVE | Noted: 2022-11-27

## 2023-01-09 ENCOUNTER — OFFICE VISIT (OUTPATIENT)
Dept: PRIMARY CARE CLINIC | Age: 48
End: 2023-01-09
Payer: COMMERCIAL

## 2023-01-09 VITALS
HEART RATE: 66 BPM | TEMPERATURE: 96.8 F | BODY MASS INDEX: 29.84 KG/M2 | HEIGHT: 59 IN | OXYGEN SATURATION: 99 % | WEIGHT: 148 LBS | SYSTOLIC BLOOD PRESSURE: 110 MMHG | RESPIRATION RATE: 16 BRPM | DIASTOLIC BLOOD PRESSURE: 70 MMHG

## 2023-01-09 DIAGNOSIS — N39.0 URINARY TRACT INFECTION WITH HEMATURIA, SITE UNSPECIFIED: Primary | ICD-10-CM

## 2023-01-09 DIAGNOSIS — R31.9 URINARY TRACT INFECTION WITH HEMATURIA, SITE UNSPECIFIED: Primary | ICD-10-CM

## 2023-01-09 DIAGNOSIS — R82.998 LEUKOCYTES IN URINE: ICD-10-CM

## 2023-01-09 LAB
BILIRUBIN, POC: NORMAL
BLOOD URINE, POC: NORMAL
CLARITY, POC: CLEAR
COLOR, POC: YELLOW
GLUCOSE URINE, POC: NORMAL
KETONES, POC: NORMAL
LEUKOCYTE EST, POC: NORMAL
NITRITE, POC: POSITIVE
PH, POC: 6
PROTEIN, POC: NORMAL
SPECIFIC GRAVITY, POC: 1.02
UROBILINOGEN, POC: 0.2

## 2023-01-09 PROCEDURE — G8419 CALC BMI OUT NRM PARAM NOF/U: HCPCS | Performed by: INTERNAL MEDICINE

## 2023-01-09 PROCEDURE — G8482 FLU IMMUNIZE ORDER/ADMIN: HCPCS | Performed by: INTERNAL MEDICINE

## 2023-01-09 PROCEDURE — 3074F SYST BP LT 130 MM HG: CPT | Performed by: INTERNAL MEDICINE

## 2023-01-09 PROCEDURE — 81002 URINALYSIS NONAUTO W/O SCOPE: CPT | Performed by: INTERNAL MEDICINE

## 2023-01-09 PROCEDURE — 3078F DIAST BP <80 MM HG: CPT | Performed by: INTERNAL MEDICINE

## 2023-01-09 PROCEDURE — 99213 OFFICE O/P EST LOW 20 MIN: CPT | Performed by: INTERNAL MEDICINE

## 2023-01-09 PROCEDURE — 1036F TOBACCO NON-USER: CPT | Performed by: INTERNAL MEDICINE

## 2023-01-09 PROCEDURE — G8427 DOCREV CUR MEDS BY ELIG CLIN: HCPCS | Performed by: INTERNAL MEDICINE

## 2023-01-09 RX ORDER — PHENAZOPYRIDINE HYDROCHLORIDE 99.5 MG/1
TABLET ORAL
COMMUNITY

## 2023-01-09 RX ORDER — CIPROFLOXACIN 250 MG/1
250 TABLET, FILM COATED ORAL 2 TIMES DAILY
Qty: 14 TABLET | Refills: 0 | Status: SHIPPED | OUTPATIENT
Start: 2023-01-09 | End: 2023-01-16

## 2023-01-09 ASSESSMENT — PATIENT HEALTH QUESTIONNAIRE - PHQ9
SUM OF ALL RESPONSES TO PHQ QUESTIONS 1-9: 0
9. THOUGHTS THAT YOU WOULD BE BETTER OFF DEAD, OR OF HURTING YOURSELF: 0
6. FEELING BAD ABOUT YOURSELF - OR THAT YOU ARE A FAILURE OR HAVE LET YOURSELF OR YOUR FAMILY DOWN: 0
4. FEELING TIRED OR HAVING LITTLE ENERGY: 0
2. FEELING DOWN, DEPRESSED OR HOPELESS: 0
7. TROUBLE CONCENTRATING ON THINGS, SUCH AS READING THE NEWSPAPER OR WATCHING TELEVISION: 0
SUM OF ALL RESPONSES TO PHQ QUESTIONS 1-9: 0
SUM OF ALL RESPONSES TO PHQ QUESTIONS 1-9: 0
5. POOR APPETITE OR OVEREATING: 0
SUM OF ALL RESPONSES TO PHQ9 QUESTIONS 1 & 2: 0
3. TROUBLE FALLING OR STAYING ASLEEP: 0
1. LITTLE INTEREST OR PLEASURE IN DOING THINGS: 0
SUM OF ALL RESPONSES TO PHQ QUESTIONS 1-9: 0
8. MOVING OR SPEAKING SO SLOWLY THAT OTHER PEOPLE COULD HAVE NOTICED. OR THE OPPOSITE, BEING SO FIGETY OR RESTLESS THAT YOU HAVE BEEN MOVING AROUND A LOT MORE THAN USUAL: 0

## 2023-01-09 NOTE — PROGRESS NOTES
Mitesh Lepe   Date ofBirth:  1975    Date of Visit:  1/9/2023    Chief Complaint   Patient presents with    Urinary Tract Infection     Burning, Pain when urinating,       HPI  Patient complains of  UTI  symptoms since Thursday 1/5/23. Patient complains of pain with end of urination, urinary frequency, urinary urgency, dysuria at first that is better with otc urinary pain relief, blood in urine once, and little back pain both sides. Patient denies fever. Review of Systems   Constitutional:  Negative for chills and fever. Gastrointestinal:  Negative for abdominal pain, nausea and vomiting. Genitourinary:  Positive for dysuria, frequency, hematuria and urgency. Negative for decreased urine volume, difficulty urinating and flank pain. Musculoskeletal:  Positive for back pain. Allergies   Allergen Reactions    Amoxicillin Itching    Fluconazole Rash     Outpatient Medications Marked as Taking for the 1/9/23 encounter (Office Visit) with Isabella Thompson MD   Medication Sig Dispense Refill    Phenazopyridine HCl (URINARY PAIN RELIEF) 99.5 MG TABS Take by mouth      amLODIPine (NORVASC) 5 MG tablet TAKE 1 TABLET BY MOUTH  DAILY 90 tablet 1    Blood Pressure Monitoring (BLOOD PRESSURE MONITOR/M CUFF) MISC Use to monitor blood pressure 1 each 0    Multiple Vitamins-Calcium (ONE-A-DAY WOMENS FORMULA) TABS Take 1 tablet by mouth daily           Vitals:    01/09/23 1443   BP: 110/70   Pulse: 66   Resp: 16   Temp: 96.8 °F (36 °C)   SpO2: 99%   Weight: 148 lb (67.1 kg)   Height: 4' 11\" (1.499 m)     Body mass index is 29.89 kg/m². Physical Exam  Nursing note reviewed. Constitutional:       General: She is not in acute distress. Appearance: Normal appearance. She is well-developed. HENT:      Mouth/Throat:      Pharynx: Oropharynx is clear. Eyes:      Extraocular Movements: Extraocular movements intact. Pupils: Pupils are equal, round, and reactive to light.    Cardiovascular: Rate and Rhythm: Normal rate and regular rhythm. Heart sounds: Normal heart sounds, S1 normal and S2 normal. No murmur heard. Pulmonary:      Effort: Pulmonary effort is normal.      Breath sounds: Normal breath sounds. Abdominal:      General: Bowel sounds are normal. There is no distension. Palpations: Abdomen is soft. Tenderness: There is no abdominal tenderness. There is no right CVA tenderness or left CVA tenderness. Comments: Patient reports feeling of having to urinate with suprapubic palpation   Neurological:      Mental Status: She is alert.          Results for POC orders placed in visit on 01/09/23   POCT Urinalysis no Micro   Result Value Ref Range    Color, UA yellow     Clarity, UA clear     Glucose, UA POC neg     Bilirubin, UA neg     Ketones, UA neg     Spec Grav, UA 1.020     Blood, UA POC trace-intact     pH, UA 6.0     Protein, UA POC neg     Urobilinogen, UA 0.2     Leukocytes, UA small     Nitrite, UA positive      Lab Review   Orders Only on 11/11/2022   Component Date Value    Color, UA 11/11/2022 Yellow     Clarity, UA 11/11/2022 Clear     Glucose, Ur 11/11/2022 Negative     Bilirubin Urine 11/11/2022 Negative     Ketones, Urine 11/11/2022 Negative     Specific Gravity, UA 11/11/2022 1.012     Blood, Urine 11/11/2022 Negative     pH, UA 11/11/2022 7.5     Protein, UA 11/11/2022 Negative     Urobilinogen, Urine 11/11/2022 0.2     Nitrite, Urine 11/11/2022 Negative     Leukocyte Esterase, Urine 11/11/2022 MODERATE (A)     Microscopic Examination 11/11/2022 YES     Urine Type 11/11/2022 NotGiven     Urine Reflex to Culture 11/11/2022 Not Indicated     Hep C Ab Interp 11/11/2022 Non-reactive     TSH 11/11/2022 1.05     Cholesterol, Total 11/11/2022 189     Triglycerides 11/11/2022 79     HDL 11/11/2022 58     LDL Calculated 11/11/2022 115 (A)     VLDL Cholesterol Calcula* 11/11/2022 16     Sodium 11/11/2022 144     Potassium 11/11/2022 3.7     Chloride 11/11/2022 106 CO2 11/11/2022 24     Anion Gap 11/11/2022 14     Glucose 11/11/2022 89     BUN 11/11/2022 15     Creatinine 11/11/2022 0.8     Est, Glom Filt Rate 11/11/2022 >60     Calcium 11/11/2022 9.9     Total Protein 11/11/2022 7.7     Albumin 11/11/2022 4.8     Albumin/Globulin Ratio 11/11/2022 1.7     Total Bilirubin 11/11/2022 <0.2     Alkaline Phosphatase 11/11/2022 114     ALT 11/11/2022 19     AST 11/11/2022 20     WBC 11/11/2022 5.2     RBC 11/11/2022 4.29     Hemoglobin 11/11/2022 12.4     Hematocrit 11/11/2022 38.2     MCV 11/11/2022 89.0     MCH 11/11/2022 28.9     MCHC 11/11/2022 32.5     RDW 11/11/2022 13.7     Platelets 31/94/6394 263     MPV 11/11/2022 8.5     Neutrophils % 11/11/2022 58.1     Lymphocytes % 11/11/2022 33.7     Monocytes % 11/11/2022 4.7     Eosinophils % 11/11/2022 2.0     Basophils % 11/11/2022 1.5     Neutrophils Absolute 11/11/2022 3.0     Lymphocytes Absolute 11/11/2022 1.7     Monocytes Absolute 11/11/2022 0.2     Eosinophils Absolute 11/11/2022 0.1     Basophils Absolute 11/11/2022 0.1     WBC, UA 11/11/2022 0-2     Bacteria, UA 11/11/2022 None Seen     Hyaline Casts, UA 11/11/2022 0     RBC, UA 11/11/2022 1     Epithelial Cells, UA 11/11/2022 2          Assessment/Plan     1. Urinary tract infection with hematuria, site unspecified  - POCT Urinalysis no Micro  - Culture, Urine  - ciprofloxacin (CIPRO) 250 MG tablet; Take 1 tablet by mouth 2 times daily for 7 days  Dispense: 14 tablet; Refill: 0  - Continue Phenazopyridine HCl (URINARY PAIN RELIEF) 99.5 MG TABS; Take by mouth as needed  -increase water intake    2. Leukocytes in urine  - suspected urinary tract infection  - Culture, Urine  - ciprofloxacin (CIPRO) 250 MG tablet; Take 1 tablet by mouth 2 times daily for 7 days  Dispense: 14 tablet; Refill: 0  -increase water intake      Discussed medications with patient, who voiced understanding of their use and indications. All questions answered.       Return if symptoms worsen or fail to improve.

## 2023-01-11 LAB
ORGANISM: ABNORMAL
ORGANISM: ABNORMAL
URINE CULTURE, ROUTINE: ABNORMAL
URINE CULTURE, ROUTINE: ABNORMAL

## 2023-01-16 ENCOUNTER — HOSPITAL ENCOUNTER (OUTPATIENT)
Dept: MAMMOGRAPHY | Age: 48
Discharge: HOME | End: 2023-01-16
Payer: COMMERCIAL

## 2023-01-16 VITALS — HEIGHT: 59 IN | BODY MASS INDEX: 29.03 KG/M2 | WEIGHT: 144 LBS

## 2023-01-16 DIAGNOSIS — Z12.31 BREAST CANCER SCREENING BY MAMMOGRAM: ICD-10-CM

## 2023-01-16 PROCEDURE — 77067 SCR MAMMO BI INCL CAD: CPT

## 2023-01-20 PROBLEM — N39.0 URINARY TRACT INFECTION WITH HEMATURIA: Status: ACTIVE | Noted: 2023-01-20

## 2023-01-20 PROBLEM — R31.9 URINARY TRACT INFECTION WITH HEMATURIA: Status: ACTIVE | Noted: 2023-01-20

## 2023-01-20 ASSESSMENT — ENCOUNTER SYMPTOMS
NAUSEA: 0
VOMITING: 0
ABDOMINAL PAIN: 0
BACK PAIN: 1

## 2023-02-25 DIAGNOSIS — I10 ESSENTIAL HYPERTENSION: ICD-10-CM

## 2023-02-27 RX ORDER — AMLODIPINE BESYLATE 5 MG/1
5 TABLET ORAL DAILY
Qty: 90 TABLET | Refills: 1 | Status: SHIPPED | OUTPATIENT
Start: 2023-02-27

## 2023-03-06 ENCOUNTER — TELEMEDICINE (OUTPATIENT)
Dept: PRIMARY CARE CLINIC | Age: 48
End: 2023-03-06

## 2023-03-06 DIAGNOSIS — R05.3 PERSISTENT COUGH: Primary | ICD-10-CM

## 2023-03-06 RX ORDER — BENZONATATE 100 MG/1
100 CAPSULE ORAL 3 TIMES DAILY PRN
Qty: 30 CAPSULE | Refills: 0 | Status: SHIPPED | OUTPATIENT
Start: 2023-03-06 | End: 2023-03-13

## 2023-03-06 RX ORDER — GUAIFENESIN AND DEXTROMETHORPHAN HYDROBROMIDE 600; 30 MG/1; MG/1
1 TABLET, EXTENDED RELEASE ORAL 2 TIMES DAILY
Qty: 20 TABLET | Refills: 0 | Status: SHIPPED | OUTPATIENT
Start: 2023-03-06

## 2023-03-06 NOTE — PROGRESS NOTES
(TESSALON PERLES) 100 MG capsule; Take 1 capsule by mouth 3 times daily as needed for Cough  Dispense: 30 capsule; Refill: 0      Return in about 10 days (around 3/16/2023) for cough. Lin Mendoza, was evaluated through a synchronous (real-time) audio-video encounter. The patient (or guardian if applicable) is aware that this is a billable service. Verbal consent to proceed has been obtained within the past 12 months. The visit was conducted pursuant to the emergency declaration under the [facility], [address] [place] [address] authority and the Patient Home Monitoring and [employer] Act. Patient identification was verified, and a caregiver was present when appropriate. The patient was located in a state where the provider was credentialed to provide care. Total time spent on this encounter: Not billed by time    --Tia Contreras MD     An electronic signature was used to authenticate this note.

## 2023-03-30 ASSESSMENT — ENCOUNTER SYMPTOMS
RHINORRHEA: 0
SHORTNESS OF BREATH: 0
SINUS PAIN: 0
SINUS PRESSURE: 0
WHEEZING: 0
CHEST TIGHTNESS: 0
SORE THROAT: 0
COUGH: 1

## 2023-05-12 ENCOUNTER — OFFICE VISIT (OUTPATIENT)
Dept: PRIMARY CARE CLINIC | Age: 48
End: 2023-05-12
Payer: COMMERCIAL

## 2023-05-12 VITALS
HEART RATE: 60 BPM | DIASTOLIC BLOOD PRESSURE: 82 MMHG | OXYGEN SATURATION: 99 % | BODY MASS INDEX: 30.05 KG/M2 | SYSTOLIC BLOOD PRESSURE: 130 MMHG | WEIGHT: 148.8 LBS

## 2023-05-12 DIAGNOSIS — I10 ESSENTIAL HYPERTENSION: Primary | ICD-10-CM

## 2023-05-12 PROCEDURE — 3074F SYST BP LT 130 MM HG: CPT | Performed by: INTERNAL MEDICINE

## 2023-05-12 PROCEDURE — 1036F TOBACCO NON-USER: CPT | Performed by: INTERNAL MEDICINE

## 2023-05-12 PROCEDURE — G8417 CALC BMI ABV UP PARAM F/U: HCPCS | Performed by: INTERNAL MEDICINE

## 2023-05-12 PROCEDURE — G8427 DOCREV CUR MEDS BY ELIG CLIN: HCPCS | Performed by: INTERNAL MEDICINE

## 2023-05-12 PROCEDURE — 3078F DIAST BP <80 MM HG: CPT | Performed by: INTERNAL MEDICINE

## 2023-05-12 PROCEDURE — 99213 OFFICE O/P EST LOW 20 MIN: CPT | Performed by: INTERNAL MEDICINE

## 2023-05-12 SDOH — ECONOMIC STABILITY: INCOME INSECURITY: HOW HARD IS IT FOR YOU TO PAY FOR THE VERY BASICS LIKE FOOD, HOUSING, MEDICAL CARE, AND HEATING?: NOT HARD AT ALL

## 2023-05-12 SDOH — ECONOMIC STABILITY: FOOD INSECURITY: WITHIN THE PAST 12 MONTHS, YOU WORRIED THAT YOUR FOOD WOULD RUN OUT BEFORE YOU GOT MONEY TO BUY MORE.: NEVER TRUE

## 2023-05-12 SDOH — ECONOMIC STABILITY: FOOD INSECURITY: WITHIN THE PAST 12 MONTHS, THE FOOD YOU BOUGHT JUST DIDN'T LAST AND YOU DIDN'T HAVE MONEY TO GET MORE.: NEVER TRUE

## 2023-05-12 SDOH — ECONOMIC STABILITY: HOUSING INSECURITY
IN THE LAST 12 MONTHS, WAS THERE A TIME WHEN YOU DID NOT HAVE A STEADY PLACE TO SLEEP OR SLEPT IN A SHELTER (INCLUDING NOW)?: NO

## 2023-05-12 NOTE — PROGRESS NOTES
(65.3 kg)   01/09/23 148 lb (67.1 kg)       Physical Exam  Nursing note reviewed. Constitutional:       General: She is not in acute distress. Appearance: Normal appearance. She is well-developed. HENT:      Head: Normocephalic and atraumatic. Mouth/Throat:      Pharynx: Oropharynx is clear. Eyes:      Extraocular Movements: Extraocular movements intact. Pupils: Pupils are equal, round, and reactive to light. Neck:      Thyroid: No thyromegaly. Vascular: No carotid bruit. Cardiovascular:      Rate and Rhythm: Normal rate and regular rhythm. Heart sounds: Normal heart sounds, S1 normal and S2 normal. No murmur heard. Pulmonary:      Effort: Pulmonary effort is normal.      Breath sounds: Normal breath sounds. Musculoskeletal:      Cervical back: Neck supple. Right lower leg: No edema. Left lower leg: No edema. Neurological:      Mental Status: She is alert. No results found for this visit on 05/12/23.   Lab Review   Office Visit on 01/09/2023   Component Date Value    Color, UA 01/09/2023 yellow     Clarity, UA 01/09/2023 clear     Glucose, UA POC 01/09/2023 neg     Bilirubin, UA 01/09/2023 neg     Ketones, UA 01/09/2023 neg     Spec Grav, UA 01/09/2023 1.020     Blood, UA POC 01/09/2023 trace-intact     pH, UA 01/09/2023 6.0     Protein, UA POC 01/09/2023 neg     Urobilinogen, UA 01/09/2023 0.2     Leukocytes, UA 01/09/2023 small     Nitrite, UA 01/09/2023 positive     Organism 01/09/2023 Escherichia coli (A)     Urine Culture, Routine 01/09/2023 75,000 CFU/ml     Organism 01/09/2023 Providencia rettgeri (A)     Urine Culture, Routine 01/09/2023 75,000 CFU/ml      Lab Results   Component Value Date/Time     11/11/2022 12:01 PM    K 3.7 11/11/2022 12:01 PM     11/11/2022 12:01 PM    CO2 24 11/11/2022 12:01 PM    BUN 15 11/11/2022 12:01 PM    CREATININE 0.8 11/11/2022 12:01 PM    GLUCOSE 89 11/11/2022 12:01 PM    CALCIUM 9.9 11/11/2022 12:01 PM

## 2023-05-14 ASSESSMENT — ENCOUNTER SYMPTOMS
SHORTNESS OF BREATH: 0
CHEST TIGHTNESS: 0

## 2023-06-20 DIAGNOSIS — I10 ESSENTIAL HYPERTENSION: ICD-10-CM

## 2023-06-20 RX ORDER — AMLODIPINE BESYLATE 5 MG/1
5 TABLET ORAL DAILY
Qty: 90 TABLET | Refills: 1 | Status: SHIPPED | OUTPATIENT
Start: 2023-06-20

## 2023-06-20 NOTE — TELEPHONE ENCOUNTER
Medication:   Requested Prescriptions     Pending Prescriptions Disp Refills    amLODIPine (NORVASC) 5 MG tablet [Pharmacy Med Name: amLODIPine Besylate 5 MG Oral Tablet] 120 tablet 2     Sig: TAKE 1 TABLET BY MOUTH DAILY     Last Filled:  2.27.23    Last appt: 5/12/2023   Next appt: 11/13/2023    Last OARRS: No flowsheet data found.

## 2023-10-12 DIAGNOSIS — I10 ESSENTIAL HYPERTENSION: ICD-10-CM

## 2023-10-12 RX ORDER — AMLODIPINE BESYLATE 5 MG/1
5 TABLET ORAL DAILY
Qty: 90 TABLET | Refills: 1 | Status: SHIPPED | OUTPATIENT
Start: 2023-10-12

## 2023-10-12 NOTE — TELEPHONE ENCOUNTER
Medication:   Requested Prescriptions     Pending Prescriptions Disp Refills    amLODIPine (NORVASC) 5 MG tablet [Pharmacy Med Name: amLODIPine Besylate 5 MG Oral Tablet] 120 tablet 2     Sig: TAKE 1 TABLET BY MOUTH DAILY     Last filled: 6.20.23  Last appt: 5/12/2023   Next appt: 11/13/2023    Last OARRS:        No data to display

## 2023-10-26 ENCOUNTER — OFFICE VISIT (OUTPATIENT)
Dept: PRIMARY CARE CLINIC | Age: 48
End: 2023-10-26
Payer: COMMERCIAL

## 2023-10-26 VITALS
HEART RATE: 78 BPM | RESPIRATION RATE: 16 BRPM | BODY MASS INDEX: 29.56 KG/M2 | TEMPERATURE: 97 F | HEIGHT: 59 IN | DIASTOLIC BLOOD PRESSURE: 84 MMHG | SYSTOLIC BLOOD PRESSURE: 122 MMHG | OXYGEN SATURATION: 98 % | WEIGHT: 146.6 LBS

## 2023-10-26 DIAGNOSIS — H61.21 IMPACTED CERUMEN OF RIGHT EAR: ICD-10-CM

## 2023-10-26 DIAGNOSIS — J06.9 UPPER RESPIRATORY TRACT INFECTION, UNSPECIFIED TYPE: ICD-10-CM

## 2023-10-26 DIAGNOSIS — J40 BRONCHITIS: Primary | ICD-10-CM

## 2023-10-26 DIAGNOSIS — Z00.00 ENCOUNTER FOR WELL ADULT EXAM WITHOUT ABNORMAL FINDINGS: Primary | ICD-10-CM

## 2023-10-26 DIAGNOSIS — Z13.1 SCREENING FOR DIABETES MELLITUS: ICD-10-CM

## 2023-10-26 PROCEDURE — 3078F DIAST BP <80 MM HG: CPT | Performed by: INTERNAL MEDICINE

## 2023-10-26 PROCEDURE — G8427 DOCREV CUR MEDS BY ELIG CLIN: HCPCS | Performed by: INTERNAL MEDICINE

## 2023-10-26 PROCEDURE — G8417 CALC BMI ABV UP PARAM F/U: HCPCS | Performed by: INTERNAL MEDICINE

## 2023-10-26 PROCEDURE — G8484 FLU IMMUNIZE NO ADMIN: HCPCS | Performed by: INTERNAL MEDICINE

## 2023-10-26 PROCEDURE — 1036F TOBACCO NON-USER: CPT | Performed by: INTERNAL MEDICINE

## 2023-10-26 PROCEDURE — 3074F SYST BP LT 130 MM HG: CPT | Performed by: INTERNAL MEDICINE

## 2023-10-26 PROCEDURE — 99213 OFFICE O/P EST LOW 20 MIN: CPT | Performed by: INTERNAL MEDICINE

## 2023-10-26 RX ORDER — DEXTROMETHORPHAN HYDROBROMIDE AND PROMETHAZINE HYDROCHLORIDE 15; 6.25 MG/5ML; MG/5ML
5 SYRUP ORAL 4 TIMES DAILY PRN
Qty: 120 ML | Refills: 0 | Status: SHIPPED | OUTPATIENT
Start: 2023-10-26

## 2023-10-26 RX ORDER — LORATADINE 10 MG/1
10 TABLET ORAL DAILY
Qty: 10 TABLET | Refills: 0 | Status: SHIPPED | OUTPATIENT
Start: 2023-10-26 | End: 2023-11-05

## 2023-10-26 RX ORDER — AZITHROMYCIN 250 MG/1
TABLET, FILM COATED ORAL
Qty: 1 PACKET | Refills: 0 | Status: SHIPPED | OUTPATIENT
Start: 2023-10-26 | End: 2023-11-05

## 2023-10-26 RX ORDER — BENZONATATE 100 MG/1
100 CAPSULE ORAL 3 TIMES DAILY PRN
Qty: 30 CAPSULE | Refills: 0 | Status: SHIPPED | OUTPATIENT
Start: 2023-10-26

## 2023-10-26 RX ORDER — ALBUTEROL SULFATE 90 UG/1
2 AEROSOL, METERED RESPIRATORY (INHALATION) EVERY 6 HOURS PRN
Qty: 18 G | Refills: 0 | Status: SHIPPED | OUTPATIENT
Start: 2023-10-26

## 2023-10-26 ASSESSMENT — ENCOUNTER SYMPTOMS
SHORTNESS OF BREATH: 0
WHEEZING: 1
COUGH: 1
SORE THROAT: 0
SINUS PRESSURE: 0
CHEST TIGHTNESS: 0
SINUS PAIN: 0
RHINORRHEA: 0

## 2023-10-26 NOTE — PROGRESS NOTES
Date of Visit: 10/26/2023    Jes Hammond (:  1975) is a 53 y.o. female,  Established patient here for evaluation of the following chief complaint(s):  Cough (Been going on since 10.21.23) and Congestion (In chest, no fever )      ASSESSMENT/PLAN:    1. Bronchitis  - promethazine-dextromethorphan (PROMETHAZINE-DM) 6.25-15 MG/5ML syrup; Take 5 mLs by mouth 4 times daily as needed for Cough  Dispense: 120 mL; Refill: 0  - benzonatate (TESSALON PERLES) 100 MG capsule; Take 1 capsule by mouth 3 times daily as needed for Cough  Dispense: 30 capsule; Refill: 0  - albuterol sulfate HFA (PROAIR HFA) 108 (90 Base) MCG/ACT inhaler; Inhale 2 puffs into the lungs every 6 hours as needed for Wheezing  Dispense: 18 g; Refill: 0  - azithromycin (ZITHROMAX) 250 MG tablet; Take 2 tabs (500 mg) on Day 1, and take 1 tab (250 mg) on days 2 through 5. Dispense: 1 packet; Refill: 0    2. Upper respiratory tract infection, unspecified type  - promethazine-dextromethorphan (PROMETHAZINE-DM) 6.25-15 MG/5ML syrup; Take 5 mLs by mouth 4 times daily as needed for Cough  Dispense: 120 mL; Refill: 0  - benzonatate (TESSALON PERLES) 100 MG capsule; Take 1 capsule by mouth 3 times daily as needed for Cough  Dispense: 30 capsule; Refill: 0  - loratadine (CLARITIN) 10 MG tablet; Take 1 tablet by mouth daily for 10 days  Dispense: 10 tablet; Refill: 0    3. Impacted cerumen of right ear  -Patient states she will get earwax out at home        Return if symptoms worsen or fail to improve. SUBJECTIVE:    Patient complains of cough since Saturday 10/21/23. Patient states she has a little phlegm she can't completely bring up, wheezing, chest congestion, and occasional coughing spells that are annoying. Patient states she has a little post nasal drainage. Patient denies runny nose, stuffy nose, sore throat, fever, and SOB. Patient states she has taken Mucinex DM which helps a little but she is still coughing.         Review of Systems

## 2023-11-10 DIAGNOSIS — Z13.1 SCREENING FOR DIABETES MELLITUS: ICD-10-CM

## 2023-11-10 DIAGNOSIS — Z00.00 ENCOUNTER FOR WELL ADULT EXAM WITHOUT ABNORMAL FINDINGS: ICD-10-CM

## 2023-11-10 LAB
BASOPHILS # BLD: 0 K/UL (ref 0–0.2)
BASOPHILS NFR BLD: 0.7 %
DEPRECATED RDW RBC AUTO: 14.2 % (ref 12.4–15.4)
EOSINOPHIL # BLD: 0.2 K/UL (ref 0–0.6)
EOSINOPHIL NFR BLD: 2.7 %
HCT VFR BLD AUTO: 37.1 % (ref 36–48)
HGB BLD-MCNC: 12.2 G/DL (ref 12–16)
LYMPHOCYTES # BLD: 2.2 K/UL (ref 1–5.1)
LYMPHOCYTES NFR BLD: 34.2 %
MCH RBC QN AUTO: 30.1 PG (ref 26–34)
MCHC RBC AUTO-ENTMCNC: 33 G/DL (ref 31–36)
MCV RBC AUTO: 91 FL (ref 80–100)
MONOCYTES # BLD: 0.4 K/UL (ref 0–1.3)
MONOCYTES NFR BLD: 6.3 %
NEUTROPHILS # BLD: 3.6 K/UL (ref 1.7–7.7)
NEUTROPHILS NFR BLD: 56.1 %
PLATELET # BLD AUTO: 306 K/UL (ref 135–450)
PMV BLD AUTO: 8.3 FL (ref 5–10.5)
RBC # BLD AUTO: 4.07 M/UL (ref 4–5.2)
WBC # BLD AUTO: 6.4 K/UL (ref 4–11)

## 2023-11-11 LAB
ALBUMIN SERPL-MCNC: 4.3 G/DL (ref 3.4–5)
ALBUMIN/GLOB SERPL: 1.4 {RATIO} (ref 1.1–2.2)
ALP SERPL-CCNC: 126 U/L (ref 40–129)
ALT SERPL-CCNC: 16 U/L (ref 10–40)
ANION GAP SERPL CALCULATED.3IONS-SCNC: 13 MMOL/L (ref 3–16)
AST SERPL-CCNC: 20 U/L (ref 15–37)
BILIRUB SERPL-MCNC: <0.2 MG/DL (ref 0–1)
BUN SERPL-MCNC: 17 MG/DL (ref 7–20)
CALCIUM SERPL-MCNC: 9.6 MG/DL (ref 8.3–10.6)
CHLORIDE SERPL-SCNC: 105 MMOL/L (ref 99–110)
CHOLEST SERPL-MCNC: 196 MG/DL (ref 0–199)
CO2 SERPL-SCNC: 24 MMOL/L (ref 21–32)
CREAT SERPL-MCNC: 0.8 MG/DL (ref 0.6–1.1)
EST. AVERAGE GLUCOSE BLD GHB EST-MCNC: 108.3 MG/DL
GFR SERPLBLD CREATININE-BSD FMLA CKD-EPI: >60 ML/MIN/{1.73_M2}
GLUCOSE SERPL-MCNC: 82 MG/DL (ref 70–99)
HBA1C MFR BLD: 5.4 %
HDLC SERPL-MCNC: 53 MG/DL (ref 40–60)
LDLC SERPL CALC-MCNC: 123 MG/DL
POTASSIUM SERPL-SCNC: 4 MMOL/L (ref 3.5–5.1)
PROT SERPL-MCNC: 7.3 G/DL (ref 6.4–8.2)
SODIUM SERPL-SCNC: 142 MMOL/L (ref 136–145)
TRIGL SERPL-MCNC: 99 MG/DL (ref 0–150)
TSH SERPL DL<=0.005 MIU/L-ACNC: 1.61 UIU/ML (ref 0.27–4.2)
VLDLC SERPL CALC-MCNC: 20 MG/DL

## 2023-11-13 ENCOUNTER — OFFICE VISIT (OUTPATIENT)
Dept: PRIMARY CARE CLINIC | Age: 48
End: 2023-11-13
Payer: COMMERCIAL

## 2023-11-13 VITALS
HEART RATE: 72 BPM | RESPIRATION RATE: 16 BRPM | SYSTOLIC BLOOD PRESSURE: 126 MMHG | DIASTOLIC BLOOD PRESSURE: 88 MMHG | OXYGEN SATURATION: 100 % | HEIGHT: 59 IN | BODY MASS INDEX: 29.84 KG/M2 | TEMPERATURE: 97.4 F | WEIGHT: 148 LBS

## 2023-11-13 DIAGNOSIS — Z23 NEED FOR INFLUENZA VACCINATION: ICD-10-CM

## 2023-11-13 DIAGNOSIS — K21.9 GASTROESOPHAGEAL REFLUX DISEASE WITHOUT ESOPHAGITIS: ICD-10-CM

## 2023-11-13 DIAGNOSIS — I10 ESSENTIAL HYPERTENSION: ICD-10-CM

## 2023-11-13 DIAGNOSIS — Z00.00 ENCOUNTER FOR WELL ADULT EXAM WITHOUT ABNORMAL FINDINGS: Primary | ICD-10-CM

## 2023-11-13 PROCEDURE — 3074F SYST BP LT 130 MM HG: CPT | Performed by: INTERNAL MEDICINE

## 2023-11-13 PROCEDURE — 99396 PREV VISIT EST AGE 40-64: CPT | Performed by: INTERNAL MEDICINE

## 2023-11-13 PROCEDURE — 90674 CCIIV4 VAC NO PRSV 0.5 ML IM: CPT | Performed by: INTERNAL MEDICINE

## 2023-11-13 PROCEDURE — 90471 IMMUNIZATION ADMIN: CPT | Performed by: INTERNAL MEDICINE

## 2023-11-13 PROCEDURE — 3078F DIAST BP <80 MM HG: CPT | Performed by: INTERNAL MEDICINE

## 2023-11-13 PROCEDURE — G8482 FLU IMMUNIZE ORDER/ADMIN: HCPCS | Performed by: INTERNAL MEDICINE

## 2023-11-13 RX ORDER — FAMOTIDINE 10 MG
10 TABLET ORAL DAILY
COMMUNITY
Start: 2023-11-13

## 2023-11-13 ASSESSMENT — ENCOUNTER SYMPTOMS
WHEEZING: 0
SINUS PRESSURE: 0
VOICE CHANGE: 0
SHORTNESS OF BREATH: 0
ANAL BLEEDING: 0
BLOOD IN STOOL: 0
ABDOMINAL DISTENTION: 0
BACK PAIN: 0
NAUSEA: 0
EYE PAIN: 0
SORE THROAT: 0
COUGH: 0
APNEA: 0
VOMITING: 0
TROUBLE SWALLOWING: 0
CONSTIPATION: 0
PHOTOPHOBIA: 0
EYE ITCHING: 0
EYE DISCHARGE: 0
RHINORRHEA: 0
SINUS PAIN: 0
DIARRHEA: 0
RECTAL PAIN: 0
ABDOMINAL PAIN: 0
CHOKING: 0
FACIAL SWELLING: 0
CHEST TIGHTNESS: 0
EYE REDNESS: 0

## 2023-11-13 NOTE — PROGRESS NOTES
MRN: [id number]    : 1964      Ms. Min Glover arrived for initial assessment. Diagnosed with rectal cancer. F/u  consult complete with Dr. Gerber Hernández. Ok to proceed with chemoradiation per Dr. Gerber Hernández. I reviewed  the plan of care discussed with surgeon,and provided emotional support. Patient visible upset due to treatment being delayed. At this time, I will follow up with patient within one week. Patient to return in 6 weeks for follow up but did not make follow up appointment at this time. Stated, she will call the office for an appointment. I gave her my card. Ms. Min Glover encouraged to contact me with any questions or concerns. 10/21/2021    Influenza, FLUCELVAX, (age 10 mo+), MDCK, PF, 0.5mL 11/11/2022, 11/13/2023    Influenza, High Dose (Fluzone 65 yrs and older) 11/01/2015    TDaP, ADACEL (age 6y-58y), BOOSTRIX (age 10y+), IM, 0.5mL 01/04/2011, 11/13/2020        Health Maintenance   Topic Date Due    Hepatitis B vaccine (1 of 3 - 3-dose series) Never done    Cervical cancer screen  07/25/2023    COVID-19 Vaccine (5 - 2023-24 season) 09/01/2023    Depression Screen  01/09/2024    Lipids  11/10/2028    DTaP/Tdap/Td vaccine (3 - Td or Tdap) 11/13/2030    Colorectal Cancer Screen  01/12/2033    Flu vaccine  Completed    Hepatitis C screen  Completed    HIV screen  Addressed    Hepatitis A vaccine  Aged Out    Hib vaccine  Aged Out    Meningococcal (ACWY) vaccine  Aged Out    Pneumococcal 0-64 years Vaccine  Aged Out     Recommendations for Local Lift Due: see orders and patient instructions/AVS.    Return in about 6 months (around 5/13/2024) for hypertension and GERD.

## 2024-01-09 DIAGNOSIS — I10 ESSENTIAL HYPERTENSION: ICD-10-CM

## 2024-01-10 RX ORDER — AMLODIPINE BESYLATE 5 MG/1
5 TABLET ORAL DAILY
Qty: 90 TABLET | Refills: 1 | Status: SHIPPED | OUTPATIENT
Start: 2024-01-10

## 2024-01-10 NOTE — TELEPHONE ENCOUNTER
Medication:   Requested Prescriptions     Pending Prescriptions Disp Refills    amLODIPine (NORVASC) 5 MG tablet [Pharmacy Med Name: amLODIPine Besylate 5 MG Oral Tablet] 120 tablet 2     Sig: TAKE 1 TABLET BY MOUTH DAILY     Last Filled:  10/12/2023    Last appt: 11/13/2023   Next appt: 5/15/2024    Last OARRS:        No data to display

## 2024-01-16 ENCOUNTER — HOSPITAL ENCOUNTER (OUTPATIENT)
Dept: MAMMOGRAPHY | Age: 49
Discharge: HOME | End: 2024-01-16
Payer: COMMERCIAL

## 2024-01-16 VITALS — HEIGHT: 59 IN | WEIGHT: 146 LBS | BODY MASS INDEX: 29.43 KG/M2

## 2024-01-16 DIAGNOSIS — Z12.31 SCREENING MAMMOGRAM FOR BREAST CANCER: ICD-10-CM

## 2024-01-16 PROCEDURE — 77063 BREAST TOMOSYNTHESIS BI: CPT

## 2024-04-18 DIAGNOSIS — I10 ESSENTIAL HYPERTENSION: ICD-10-CM

## 2024-04-19 RX ORDER — AMLODIPINE BESYLATE 5 MG/1
5 TABLET ORAL DAILY
Qty: 120 TABLET | Refills: 2 | Status: SHIPPED | OUTPATIENT
Start: 2024-04-19

## 2024-04-19 NOTE — TELEPHONE ENCOUNTER
Medication:   Requested Prescriptions     Pending Prescriptions Disp Refills    amLODIPine (NORVASC) 5 MG tablet [Pharmacy Med Name: amLODIPine Besylate 5 MG Oral Tablet] 120 tablet 2     Sig: TAKE 1 TABLET BY MOUTH DAILY     Last Filled:  1.10.24    Last appt: 11/13/2023   Next appt: 5/15/2024    Last OARRS:        No data to display

## 2024-05-15 ENCOUNTER — OFFICE VISIT (OUTPATIENT)
Dept: PRIMARY CARE CLINIC | Age: 49
End: 2024-05-15
Payer: COMMERCIAL

## 2024-05-15 VITALS
BODY MASS INDEX: 29.65 KG/M2 | SYSTOLIC BLOOD PRESSURE: 128 MMHG | DIASTOLIC BLOOD PRESSURE: 84 MMHG | WEIGHT: 146.8 LBS | HEART RATE: 78 BPM | OXYGEN SATURATION: 96 %

## 2024-05-15 DIAGNOSIS — Z13.1 SCREENING FOR DIABETES MELLITUS: ICD-10-CM

## 2024-05-15 DIAGNOSIS — K21.9 GASTROESOPHAGEAL REFLUX DISEASE WITHOUT ESOPHAGITIS: ICD-10-CM

## 2024-05-15 DIAGNOSIS — I10 ESSENTIAL HYPERTENSION: Primary | ICD-10-CM

## 2024-05-15 DIAGNOSIS — Z00.00 ENCOUNTER FOR WELL ADULT EXAM WITHOUT ABNORMAL FINDINGS: Primary | ICD-10-CM

## 2024-05-15 PROCEDURE — 99213 OFFICE O/P EST LOW 20 MIN: CPT | Performed by: INTERNAL MEDICINE

## 2024-05-15 PROCEDURE — G8427 DOCREV CUR MEDS BY ELIG CLIN: HCPCS | Performed by: INTERNAL MEDICINE

## 2024-05-15 PROCEDURE — 3079F DIAST BP 80-89 MM HG: CPT | Performed by: INTERNAL MEDICINE

## 2024-05-15 PROCEDURE — 3074F SYST BP LT 130 MM HG: CPT | Performed by: INTERNAL MEDICINE

## 2024-05-15 PROCEDURE — G8417 CALC BMI ABV UP PARAM F/U: HCPCS | Performed by: INTERNAL MEDICINE

## 2024-05-15 PROCEDURE — 1036F TOBACCO NON-USER: CPT | Performed by: INTERNAL MEDICINE

## 2024-05-15 SDOH — ECONOMIC STABILITY: FOOD INSECURITY: WITHIN THE PAST 12 MONTHS, YOU WORRIED THAT YOUR FOOD WOULD RUN OUT BEFORE YOU GOT MONEY TO BUY MORE.: NEVER TRUE

## 2024-05-15 SDOH — ECONOMIC STABILITY: FOOD INSECURITY: WITHIN THE PAST 12 MONTHS, THE FOOD YOU BOUGHT JUST DIDN'T LAST AND YOU DIDN'T HAVE MONEY TO GET MORE.: NEVER TRUE

## 2024-05-15 SDOH — ECONOMIC STABILITY: INCOME INSECURITY: HOW HARD IS IT FOR YOU TO PAY FOR THE VERY BASICS LIKE FOOD, HOUSING, MEDICAL CARE, AND HEATING?: NOT HARD AT ALL

## 2024-05-15 ASSESSMENT — PATIENT HEALTH QUESTIONNAIRE - PHQ9
5. POOR APPETITE OR OVEREATING: NOT AT ALL
SUM OF ALL RESPONSES TO PHQ QUESTIONS 1-9: 0
SUM OF ALL RESPONSES TO PHQ QUESTIONS 1-9: 0
8. MOVING OR SPEAKING SO SLOWLY THAT OTHER PEOPLE COULD HAVE NOTICED. OR THE OPPOSITE, BEING SO FIGETY OR RESTLESS THAT YOU HAVE BEEN MOVING AROUND A LOT MORE THAN USUAL: NOT AT ALL
10. IF YOU CHECKED OFF ANY PROBLEMS, HOW DIFFICULT HAVE THESE PROBLEMS MADE IT FOR YOU TO DO YOUR WORK, TAKE CARE OF THINGS AT HOME, OR GET ALONG WITH OTHER PEOPLE: NOT DIFFICULT AT ALL
9. THOUGHTS THAT YOU WOULD BE BETTER OFF DEAD, OR OF HURTING YOURSELF: NOT AT ALL
SUM OF ALL RESPONSES TO PHQ QUESTIONS 1-9: 0
3. TROUBLE FALLING OR STAYING ASLEEP: NOT AT ALL
2. FEELING DOWN, DEPRESSED OR HOPELESS: NOT AT ALL
4. FEELING TIRED OR HAVING LITTLE ENERGY: NOT AT ALL
7. TROUBLE CONCENTRATING ON THINGS, SUCH AS READING THE NEWSPAPER OR WATCHING TELEVISION: NOT AT ALL
6. FEELING BAD ABOUT YOURSELF - OR THAT YOU ARE A FAILURE OR HAVE LET YOURSELF OR YOUR FAMILY DOWN: NOT AT ALL
1. LITTLE INTEREST OR PLEASURE IN DOING THINGS: NOT AT ALL
SUM OF ALL RESPONSES TO PHQ9 QUESTIONS 1 & 2: 0
SUM OF ALL RESPONSES TO PHQ QUESTIONS 1-9: 0

## 2024-05-15 NOTE — PROGRESS NOTES
Date of Visit: 5/15/2024    Lin Woodward (:  1975) is a 48 y.o. female,  Established patient here for evaluation of the following chief complaint(s):  Hypertension and Gastroesophageal Reflux      ASSESSMENT/PLAN:    1. Essential hypertension  -stable  -Continue amlodipine 5 mg once daily  -Low sodium diet  -Regular aerobic exercise    2. Gastroesophageal reflux disease without esophagitis  -Stable  -Continue famotidine (PEPCID) 10 MG tablet; Take 1 tablet by mouth daily  -Decrease caffeine, avoid spicy foods, avoid tomato based foods  -Eat small meals instead of large meals  -Wait 2-3 hours after eating before lying down        Return in about 26 weeks (around 2024) for annual physical exam.    SUBJECTIVE:    Patient has hypertension. Patient takes amlodipine 5 mg once daily. Patient decreases salt. Patient walks for exercise and does aerobics and strength training.     Patient has GERD. Patient takes Famotidine 10mg once daily. Patient denies heartburn or reflux. Patient states she eats very little spicy food and rarely eats tomato based food. Patient states she drinks half caffeinated coffee or decaffeinated coffee.      Review of Systems   HENT:  Negative for sore throat and trouble swallowing.    Eyes:  Negative for visual disturbance.   Respiratory:  Negative for chest tightness, shortness of breath and wheezing.    Cardiovascular:  Negative for chest pain, palpitations and leg swelling.   Gastrointestinal:  Negative for abdominal pain, blood in stool, nausea and vomiting.   Genitourinary:  Negative for frequency and hematuria.   Neurological:  Negative for dizziness, syncope, light-headedness and headaches.       Allergies   Allergen Reactions    Amoxicillin Itching    Fluconazole Rash       Outpatient Medications Marked as Taking for the 5/15/24 encounter (Office Visit) with Emmanuelle Nye MD   Medication Sig Dispense Refill    amLODIPine (NORVASC) 5 MG tablet TAKE 1 TABLET BY MOUTH

## 2024-05-15 NOTE — PATIENT INSTRUCTIONS
-Low sodium diet  -Decrease caffeine, avoid spicy foods, avoid tomato based foods  -Eat small meals instead of large meals  -Wait 2-3 hours after eating before lying down

## 2024-05-18 ASSESSMENT — ENCOUNTER SYMPTOMS
VOMITING: 0
TROUBLE SWALLOWING: 0
BLOOD IN STOOL: 0
NAUSEA: 0
SORE THROAT: 0
ABDOMINAL PAIN: 0
CHEST TIGHTNESS: 0
WHEEZING: 0
SHORTNESS OF BREATH: 0

## 2024-11-07 DIAGNOSIS — Z00.00 ENCOUNTER FOR WELL ADULT EXAM WITHOUT ABNORMAL FINDINGS: ICD-10-CM

## 2024-11-07 DIAGNOSIS — Z13.1 SCREENING FOR DIABETES MELLITUS: ICD-10-CM

## 2024-11-07 LAB
ALBUMIN SERPL-MCNC: 4.5 G/DL (ref 3.4–5)
ALBUMIN/GLOB SERPL: 1.5 {RATIO} (ref 1.1–2.2)
ALP SERPL-CCNC: 149 U/L (ref 40–129)
ALT SERPL-CCNC: 22 U/L (ref 10–40)
ANION GAP SERPL CALCULATED.3IONS-SCNC: 11 MMOL/L (ref 3–16)
AST SERPL-CCNC: 24 U/L (ref 15–37)
BASOPHILS # BLD: 0 K/UL (ref 0–0.2)
BASOPHILS NFR BLD: 0.7 %
BILIRUB SERPL-MCNC: <0.2 MG/DL (ref 0–1)
BUN SERPL-MCNC: 19 MG/DL (ref 7–20)
CALCIUM SERPL-MCNC: 9.7 MG/DL (ref 8.3–10.6)
CHLORIDE SERPL-SCNC: 104 MMOL/L (ref 99–110)
CHOLEST SERPL-MCNC: 184 MG/DL (ref 0–199)
CO2 SERPL-SCNC: 26 MMOL/L (ref 21–32)
CREAT SERPL-MCNC: 0.9 MG/DL (ref 0.6–1.1)
DEPRECATED RDW RBC AUTO: 14.5 % (ref 12.4–15.4)
EOSINOPHIL # BLD: 0.1 K/UL (ref 0–0.6)
EOSINOPHIL NFR BLD: 2.5 %
GFR SERPLBLD CREATININE-BSD FMLA CKD-EPI: 79 ML/MIN/{1.73_M2}
GLUCOSE SERPL-MCNC: 83 MG/DL (ref 70–99)
HCT VFR BLD AUTO: 37.2 % (ref 36–48)
HDLC SERPL-MCNC: 58 MG/DL (ref 40–60)
HGB BLD-MCNC: 12.2 G/DL (ref 12–16)
LDLC SERPL CALC-MCNC: 114 MG/DL
LYMPHOCYTES # BLD: 2 K/UL (ref 1–5.1)
LYMPHOCYTES NFR BLD: 35.2 %
MCH RBC QN AUTO: 29.1 PG (ref 26–34)
MCHC RBC AUTO-ENTMCNC: 32.8 G/DL (ref 31–36)
MCV RBC AUTO: 88.8 FL (ref 80–100)
MONOCYTES # BLD: 0.4 K/UL (ref 0–1.3)
MONOCYTES NFR BLD: 6.4 %
NEUTROPHILS # BLD: 3.1 K/UL (ref 1.7–7.7)
NEUTROPHILS NFR BLD: 55.2 %
PLATELET # BLD AUTO: 288 K/UL (ref 135–450)
PMV BLD AUTO: 8.7 FL (ref 5–10.5)
POTASSIUM SERPL-SCNC: 4.5 MMOL/L (ref 3.5–5.1)
PROT SERPL-MCNC: 7.6 G/DL (ref 6.4–8.2)
RBC # BLD AUTO: 4.19 M/UL (ref 4–5.2)
SODIUM SERPL-SCNC: 141 MMOL/L (ref 136–145)
TRIGL SERPL-MCNC: 58 MG/DL (ref 0–150)
TSH SERPL DL<=0.005 MIU/L-ACNC: 1.08 UIU/ML (ref 0.27–4.2)
VLDLC SERPL CALC-MCNC: 12 MG/DL
WBC # BLD AUTO: 5.7 K/UL (ref 4–11)

## 2024-11-08 LAB
EST. AVERAGE GLUCOSE BLD GHB EST-MCNC: 105.4 MG/DL
HBA1C MFR BLD: 5.3 %

## 2024-11-13 ENCOUNTER — OFFICE VISIT (OUTPATIENT)
Dept: PRIMARY CARE CLINIC | Age: 49
End: 2024-11-13
Payer: COMMERCIAL

## 2024-11-13 VITALS
HEART RATE: 76 BPM | OXYGEN SATURATION: 99 % | DIASTOLIC BLOOD PRESSURE: 80 MMHG | TEMPERATURE: 96.8 F | HEIGHT: 59 IN | SYSTOLIC BLOOD PRESSURE: 126 MMHG | BODY MASS INDEX: 30.36 KG/M2 | RESPIRATION RATE: 16 BRPM | WEIGHT: 150.6 LBS

## 2024-11-13 DIAGNOSIS — R74.8 ELEVATED ALKALINE PHOSPHATASE LEVEL: ICD-10-CM

## 2024-11-13 DIAGNOSIS — F51.01 PRIMARY INSOMNIA: ICD-10-CM

## 2024-11-13 DIAGNOSIS — I10 ESSENTIAL HYPERTENSION: ICD-10-CM

## 2024-11-13 DIAGNOSIS — Z00.00 ENCOUNTER FOR WELL ADULT EXAM WITHOUT ABNORMAL FINDINGS: Primary | ICD-10-CM

## 2024-11-13 DIAGNOSIS — K21.9 GASTROESOPHAGEAL REFLUX DISEASE WITHOUT ESOPHAGITIS: ICD-10-CM

## 2024-11-13 LAB
BILIRUBIN, POC: NORMAL
BLOOD URINE, POC: NORMAL
CLARITY, POC: CLEAR
COLOR, POC: YELLOW
GLUCOSE URINE, POC: NORMAL MG/DL
KETONES, POC: NORMAL MG/DL
LEUKOCYTE EST, POC: NORMAL
NITRITE, POC: NORMAL
PH, POC: 7.5
PROTEIN, POC: NORMAL MG/DL
SPECIFIC GRAVITY, POC: 1.01
UROBILINOGEN, POC: 0.2 MG/DL

## 2024-11-13 PROCEDURE — G8484 FLU IMMUNIZE NO ADMIN: HCPCS | Performed by: INTERNAL MEDICINE

## 2024-11-13 PROCEDURE — 3079F DIAST BP 80-89 MM HG: CPT | Performed by: INTERNAL MEDICINE

## 2024-11-13 PROCEDURE — 99396 PREV VISIT EST AGE 40-64: CPT | Performed by: INTERNAL MEDICINE

## 2024-11-13 PROCEDURE — 3074F SYST BP LT 130 MM HG: CPT | Performed by: INTERNAL MEDICINE

## 2024-11-13 PROCEDURE — 81002 URINALYSIS NONAUTO W/O SCOPE: CPT | Performed by: INTERNAL MEDICINE

## 2024-11-13 RX ORDER — FAMOTIDINE 20 MG/1
20 TABLET, FILM COATED ORAL DAILY
COMMUNITY

## 2024-11-13 NOTE — PROGRESS NOTES
Absolute 11/07/2024 2.0     Monocytes Absolute 11/07/2024 0.4     Eosinophils Absolute 11/07/2024 0.1     Basophils Absolute 11/07/2024 0.0              Personalized Preventive Plan  Current Health Maintenance Status  Immunization History   Administered Date(s) Administered    COVID-19, MODERNA BLUE border, Primary or Immunocompromised, (age 12y+), IM, 100 mcg/0.5mL 02/05/2021, 03/05/2021, 10/29/2021    COVID-19, MODERNA Bivalent, (age 12y+), IM, 50 mcg/0.5 mL 10/06/2022    COVID-19, MODERNA, (age 12y+), IM, 50mcg/0.5mL 12/06/2023    COVID-19, PFIZER, (age 12y+), IM, 30mcg/0.3mL 09/04/2024    Influenza Vaccine, unspecified formulation 10/01/2016, 10/08/2017    Influenza Virus Vaccine 10/13/2019, 10/12/2020    Influenza, FLUBLOK, (age 18 y+), Quadv PF, 0.5mL 10/21/2021    Influenza, FLUCELVAX, (age 6 mo+), MDCK, Quadv PF, 0.5mL 11/11/2022, 11/13/2023    Influenza, FLUZONE High Dose, (age 65 y+), IM, Trivalent PF, 0.5mL 11/01/2015    TDaP, ADACEL (age 10y-64y), BOOSTRIX (age 10y+), IM, 0.5mL 01/04/2011, 11/13/2020        Health Maintenance Due   Topic Date Due    Hepatitis B vaccine (1 of 3 - 19+ 3-dose series) Never done      Recommendations for Preventive Services Due: see orders and patient instructions/AVS.

## 2024-11-25 PROBLEM — R74.8 ELEVATED ALKALINE PHOSPHATASE LEVEL: Status: ACTIVE | Noted: 2024-11-25

## 2024-11-25 PROBLEM — Z00.00 ENCOUNTER FOR WELL ADULT EXAM WITHOUT ABNORMAL FINDINGS: Status: ACTIVE | Noted: 2024-11-25

## 2024-11-25 PROBLEM — F51.01 PRIMARY INSOMNIA: Status: ACTIVE | Noted: 2024-11-25

## 2024-11-25 ASSESSMENT — ENCOUNTER SYMPTOMS
APNEA: 0
PHOTOPHOBIA: 0
EYE DISCHARGE: 0
SORE THROAT: 0
RHINORRHEA: 0
FACIAL SWELLING: 0
EYE PAIN: 0
VOMITING: 0
BACK PAIN: 0
VOICE CHANGE: 0
SINUS PAIN: 0
BLOOD IN STOOL: 0
DIARRHEA: 0
EYE REDNESS: 0
WHEEZING: 0
SHORTNESS OF BREATH: 0
COUGH: 0
RECTAL PAIN: 0
CHOKING: 0
SINUS PRESSURE: 0
TROUBLE SWALLOWING: 0
ABDOMINAL DISTENTION: 0
NAUSEA: 0
CHEST TIGHTNESS: 0
CONSTIPATION: 0
ANAL BLEEDING: 0
EYE ITCHING: 0
ABDOMINAL PAIN: 0

## 2024-11-25 NOTE — ASSESSMENT & PLAN NOTE
-Comprehensive metabolic panel, CBC with differential, TSH, lipid panel, and Hemoglobin A1c were all done on 11/7/2024  -Urinalysis  -Pap smear done on 11/13/2023  -Mammogram done 01/16/2024  -Colonoscopy done on 1/12/2023

## 2024-11-25 NOTE — ASSESSMENT & PLAN NOTE
-stable  -Continue Famotidine 20mg once daily  -Decrease caffeine, avoid spicy foods, avoid tomato based foods  -Eat small meals instead of large meals  -Wait 2-3 hours after eating before lying down

## 2024-12-02 DIAGNOSIS — R74.8 ELEVATED ALKALINE PHOSPHATASE LEVEL: ICD-10-CM

## 2024-12-02 LAB — ALP SERPL-CCNC: 133 U/L (ref 40–129)

## 2024-12-25 PROBLEM — Z00.00 ENCOUNTER FOR WELL ADULT EXAM WITHOUT ABNORMAL FINDINGS: Status: RESOLVED | Noted: 2024-11-25 | Resolved: 2024-12-25

## 2025-01-14 ENCOUNTER — OFFICE VISIT (OUTPATIENT)
Dept: PRIMARY CARE CLINIC | Age: 50
End: 2025-01-14
Payer: COMMERCIAL

## 2025-01-14 VITALS
HEART RATE: 89 BPM | WEIGHT: 153.6 LBS | TEMPERATURE: 97.1 F | SYSTOLIC BLOOD PRESSURE: 132 MMHG | BODY MASS INDEX: 31.02 KG/M2 | OXYGEN SATURATION: 100 % | DIASTOLIC BLOOD PRESSURE: 76 MMHG

## 2025-01-14 DIAGNOSIS — H93.8X3 SENSATION OF PLUGGED EAR ON BOTH SIDES: Primary | ICD-10-CM

## 2025-01-14 DIAGNOSIS — H92.03 EAR PAIN, BILATERAL: ICD-10-CM

## 2025-01-14 DIAGNOSIS — H61.21 EXCESSIVE WAX IN RIGHT EAR: ICD-10-CM

## 2025-01-14 PROCEDURE — G8417 CALC BMI ABV UP PARAM F/U: HCPCS | Performed by: INTERNAL MEDICINE

## 2025-01-14 PROCEDURE — G8427 DOCREV CUR MEDS BY ELIG CLIN: HCPCS | Performed by: INTERNAL MEDICINE

## 2025-01-14 PROCEDURE — 3078F DIAST BP <80 MM HG: CPT | Performed by: INTERNAL MEDICINE

## 2025-01-14 PROCEDURE — 99213 OFFICE O/P EST LOW 20 MIN: CPT | Performed by: INTERNAL MEDICINE

## 2025-01-14 PROCEDURE — 1036F TOBACCO NON-USER: CPT | Performed by: INTERNAL MEDICINE

## 2025-01-14 PROCEDURE — 3075F SYST BP GE 130 - 139MM HG: CPT | Performed by: INTERNAL MEDICINE

## 2025-01-14 SDOH — ECONOMIC STABILITY: FOOD INSECURITY: WITHIN THE PAST 12 MONTHS, YOU WORRIED THAT YOUR FOOD WOULD RUN OUT BEFORE YOU GOT MONEY TO BUY MORE.: NEVER TRUE

## 2025-01-14 SDOH — ECONOMIC STABILITY: FOOD INSECURITY: WITHIN THE PAST 12 MONTHS, THE FOOD YOU BOUGHT JUST DIDN'T LAST AND YOU DIDN'T HAVE MONEY TO GET MORE.: NEVER TRUE

## 2025-01-14 ASSESSMENT — PATIENT HEALTH QUESTIONNAIRE - PHQ9
1. LITTLE INTEREST OR PLEASURE IN DOING THINGS: NOT AT ALL
2. FEELING DOWN, DEPRESSED OR HOPELESS: NOT AT ALL
SUM OF ALL RESPONSES TO PHQ9 QUESTIONS 1 & 2: 0
SUM OF ALL RESPONSES TO PHQ QUESTIONS 1-9: 0

## 2025-01-14 NOTE — PROGRESS NOTES
11/07/2024 29.1     MCHC 11/07/2024 32.8     RDW 11/07/2024 14.5     Platelets 11/07/2024 288     MPV 11/07/2024 8.7     Neutrophils % 11/07/2024 55.2     Lymphocytes % 11/07/2024 35.2     Monocytes % 11/07/2024 6.4     Eosinophils % 11/07/2024 2.5     Basophils % 11/07/2024 0.7     Neutrophils Absolute 11/07/2024 3.1     Lymphocytes Absolute 11/07/2024 2.0     Monocytes Absolute 11/07/2024 0.4     Eosinophils Absolute 11/07/2024 0.1     Basophils Absolute 11/07/2024 0.0            Medications, Allergies, Social history, Medical history, and results reviewed      An electronic signature was used to authenticate this note.    -Emmanuelle Nye MD

## 2025-01-20 ENCOUNTER — HOSPITAL ENCOUNTER (OUTPATIENT)
Dept: MAMMOGRAPHY | Age: 50
Discharge: HOME | End: 2025-01-20
Payer: COMMERCIAL

## 2025-01-20 DIAGNOSIS — Z12.31 ENCOUNTER FOR SCREENING MAMMOGRAM FOR BREAST CANCER: ICD-10-CM

## 2025-01-20 PROCEDURE — 77063 BREAST TOMOSYNTHESIS BI: CPT

## 2025-01-24 PROBLEM — H92.03 EAR PAIN, BILATERAL: Status: ACTIVE | Noted: 2025-01-24

## 2025-01-24 PROBLEM — H93.8X3 SENSATION OF PLUGGED EAR ON BOTH SIDES: Status: ACTIVE | Noted: 2025-01-24

## 2025-01-24 PROBLEM — H61.21: Status: ACTIVE | Noted: 2025-01-24

## 2025-01-24 ASSESSMENT — ENCOUNTER SYMPTOMS
CHEST TIGHTNESS: 0
COUGH: 0
SORE THROAT: 0
SINUS PAIN: 0
WHEEZING: 0
RHINORRHEA: 0
SINUS PRESSURE: 0
SHORTNESS OF BREATH: 0

## 2025-02-26 ENCOUNTER — OFFICE VISIT (OUTPATIENT)
Dept: ENT CLINIC | Age: 50
End: 2025-02-26
Payer: COMMERCIAL

## 2025-02-26 VITALS
WEIGHT: 158 LBS | SYSTOLIC BLOOD PRESSURE: 130 MMHG | DIASTOLIC BLOOD PRESSURE: 80 MMHG | HEART RATE: 65 BPM | OXYGEN SATURATION: 100 % | HEIGHT: 59 IN | BODY MASS INDEX: 31.85 KG/M2

## 2025-02-26 DIAGNOSIS — H61.92 LESION OF EXTERNAL EAR CANAL, LEFT: ICD-10-CM

## 2025-02-26 DIAGNOSIS — H69.93 DYSFUNCTION OF BOTH EUSTACHIAN TUBES: Primary | ICD-10-CM

## 2025-02-26 PROCEDURE — G8427 DOCREV CUR MEDS BY ELIG CLIN: HCPCS | Performed by: OTOLARYNGOLOGY

## 2025-02-26 PROCEDURE — G8417 CALC BMI ABV UP PARAM F/U: HCPCS | Performed by: OTOLARYNGOLOGY

## 2025-02-26 PROCEDURE — 3075F SYST BP GE 130 - 139MM HG: CPT | Performed by: OTOLARYNGOLOGY

## 2025-02-26 PROCEDURE — 1036F TOBACCO NON-USER: CPT | Performed by: OTOLARYNGOLOGY

## 2025-02-26 PROCEDURE — 3079F DIAST BP 80-89 MM HG: CPT | Performed by: OTOLARYNGOLOGY

## 2025-02-26 PROCEDURE — 4130F TOPICAL PREP RX AOE: CPT | Performed by: OTOLARYNGOLOGY

## 2025-02-26 PROCEDURE — 99203 OFFICE O/P NEW LOW 30 MIN: CPT | Performed by: OTOLARYNGOLOGY

## 2025-02-26 RX ORDER — FLUTICASONE PROPIONATE 50 MCG
1 SPRAY, SUSPENSION (ML) NASAL DAILY
Qty: 32 G | Refills: 1 | Status: SHIPPED | OUTPATIENT
Start: 2025-02-26

## 2025-02-26 NOTE — PROGRESS NOTES
[facility]  DIVISION OF OTOLARYNGOLOGY- HEAD & NECK SURGERY  CONSULT      Patient Name: Lin Woodward  Medical Record Number:  [id number]  Primary Care Physician:  Emmanuelle Nye MD  Date of Consultation: 2/26/2025    Chief Complaint: Ear issues        HISTORY OF PRESENT ILLNESS  Lin is a(n) 49 y.o. female who presents for evaluation of ear issues.  She had a cold around [place] and says her ears felt plugged after that.  She said that it seems to have slowly gotten better.  She still feels like it plugs up briefly on occasion.  She does not have a significant ear history.  She does have seasonal allergies.  She is currently not taking anything.            REVIEW OF SYSTEMS    As above  PHYSICAL EXAM  GENERAL: No Acute Distress, Alert and Oriented, no Hoarseness, strong voice  EYES: EOMI, Anti-icteric  HENT:   Head: Normocephalic and atraumatic.   Face:  Symmetric, facial nerve intact, no sinus tenderness  Ear: See below  Mouth/Oral Cavity:  normal lips, Uvula is midline, no mucosal lesions, no trismus  Oropharynx/Larynx:  normal oropharynx,  Nose:Normal external nasal appearance  NECK: Normal range of motion, no thyromegaly, trachea is midline, no lymphadenopathy, no neck masses, no crepitus          PROCEDURE  Bilateral ear exam  Right ear was visualized binoculars scope.  Tympanic membrane is intact with aerated middle ear pinna left side she had a small pedunculated lesion on the posterior aspect of the lateral canal.  Tympanic membrane is intact with aerated middle ear        ASSESSMENT/PLAN  1. Dysfunction of both eustachian tubes  The patient had a cold and sounds that she had some eustachian dysfunction following up.  Is getting better.  This is within the realm of normal as it can take up to 3 months for it to completely go away.  I started on Flonase to encourage it to completely go away  2. Lesion of external ear canal, left  Papillomatous appearing lesion of the lateral canal.  Recommend

## 2025-04-01 DIAGNOSIS — I10 ESSENTIAL HYPERTENSION: ICD-10-CM

## 2025-04-02 RX ORDER — AMLODIPINE BESYLATE 5 MG/1
5 TABLET ORAL DAILY
Qty: 90 TABLET | Refills: 1 | Status: SHIPPED | OUTPATIENT
Start: 2025-04-02

## 2025-04-02 NOTE — TELEPHONE ENCOUNTER
Medication:   Requested Prescriptions     Pending Prescriptions Disp Refills    amLODIPine (NORVASC) 5 MG tablet [Pharmacy Med Name: amLODIPine Besylate 5 MG Oral Tablet] 120 tablet 2     Sig: TAKE 1 TABLET BY MOUTH DAILY     Last Filled:  04/19/2024    Last appt: 1/14/2025   Next appt: 5/13/2025    Last OARRS:        No data to display

## 2025-05-13 ENCOUNTER — OFFICE VISIT (OUTPATIENT)
Dept: PRIMARY CARE CLINIC | Age: 50
End: 2025-05-13
Payer: COMMERCIAL

## 2025-05-13 VITALS
BODY MASS INDEX: 31.04 KG/M2 | WEIGHT: 154 LBS | OXYGEN SATURATION: 100 % | SYSTOLIC BLOOD PRESSURE: 110 MMHG | DIASTOLIC BLOOD PRESSURE: 84 MMHG | HEIGHT: 59 IN | HEART RATE: 69 BPM | RESPIRATION RATE: 14 BRPM

## 2025-05-13 DIAGNOSIS — I10 ESSENTIAL HYPERTENSION: Primary | ICD-10-CM

## 2025-05-13 DIAGNOSIS — Z13.1 SCREENING FOR DIABETES MELLITUS: ICD-10-CM

## 2025-05-13 DIAGNOSIS — Z00.00 ENCOUNTER FOR WELL ADULT EXAM WITHOUT ABNORMAL FINDINGS: Primary | ICD-10-CM

## 2025-05-13 PROCEDURE — G8417 CALC BMI ABV UP PARAM F/U: HCPCS | Performed by: INTERNAL MEDICINE

## 2025-05-13 PROCEDURE — G8427 DOCREV CUR MEDS BY ELIG CLIN: HCPCS | Performed by: INTERNAL MEDICINE

## 2025-05-13 PROCEDURE — 99213 OFFICE O/P EST LOW 20 MIN: CPT | Performed by: INTERNAL MEDICINE

## 2025-05-13 PROCEDURE — 3079F DIAST BP 80-89 MM HG: CPT | Performed by: INTERNAL MEDICINE

## 2025-05-13 PROCEDURE — 1036F TOBACCO NON-USER: CPT | Performed by: INTERNAL MEDICINE

## 2025-05-13 PROCEDURE — 3074F SYST BP LT 130 MM HG: CPT | Performed by: INTERNAL MEDICINE

## 2025-05-13 RX ORDER — ESTRADIOL 0.04 MG/D
PATCH, EXTENDED RELEASE TRANSDERMAL
COMMUNITY
Start: 2025-01-21

## 2025-05-13 RX ORDER — PROGESTERONE 100 MG/1
CAPSULE ORAL
COMMUNITY
Start: 2025-01-21

## 2025-05-13 SDOH — ECONOMIC STABILITY: FOOD INSECURITY: WITHIN THE PAST 12 MONTHS, THE FOOD YOU BOUGHT JUST DIDN'T LAST AND YOU DIDN'T HAVE MONEY TO GET MORE.: NEVER TRUE

## 2025-05-13 SDOH — ECONOMIC STABILITY: FOOD INSECURITY: WITHIN THE PAST 12 MONTHS, YOU WORRIED THAT YOUR FOOD WOULD RUN OUT BEFORE YOU GOT MONEY TO BUY MORE.: NEVER TRUE

## 2025-05-13 ASSESSMENT — PATIENT HEALTH QUESTIONNAIRE - PHQ9
SUM OF ALL RESPONSES TO PHQ QUESTIONS 1-9: 0
2. FEELING DOWN, DEPRESSED OR HOPELESS: NOT AT ALL
SUM OF ALL RESPONSES TO PHQ QUESTIONS 1-9: 0
1. LITTLE INTEREST OR PLEASURE IN DOING THINGS: NOT AT ALL
SUM OF ALL RESPONSES TO PHQ QUESTIONS 1-9: 0
SUM OF ALL RESPONSES TO PHQ QUESTIONS 1-9: 0

## 2025-05-13 NOTE — PROGRESS NOTES
Date of Visit: 2025    Lin Woodward (:  1975) is a 49 y.o. female,  Established patient here for evaluation of the following chief complaint(s):  Hypertension      ASSESSMENT/PLAN:    Assessment & Plan  1. Essential hypertension  - Currently stable  - Continue amlodipine 5 mg once daily  - low sodium diet  - Regular aerobic exercise        Return in about 6 months (around 2025) for annual physical exam.    SUBJECTIVE:    History of Present Illness  The patient is a 49-year-old female who presents for evaluation of hypertension.    She has hypertension. She takes amlodipine 5 mg once daily. She reports no adverse effects or complications associated with her current antihypertensive medication. She adheres to a low-sodium diet and engages in regular physical activity, including walking, light aerobics, and strength training, approximately 4 to 6 times per week.      Review of Systems   Eyes:  Negative for visual disturbance.   Respiratory:  Negative for chest tightness and shortness of breath.    Cardiovascular:  Negative for chest pain, palpitations and leg swelling.   Genitourinary:  Negative for frequency and hematuria.   Neurological:  Negative for dizziness, syncope, light-headedness and headaches.       Allergies   Allergen Reactions    Amoxicillin Itching    Fluconazole Rash       Prior to Visit Medications    Medication Sig Taking? Authorizing Provider   estradiol (VIVELLE) 0.0375 MG/24HR 1 patch to skin Transdermal Two times a Week for 84 days Yes Chavez Steele MD   progesterone (PROMETRIUM) 100 MG CAPS capsule 1 capsule at bedtime Orally Once a day for 90 days Yes Chavez Steele MD   amLODIPine (NORVASC) 5 MG tablet TAKE 1 TABLET BY MOUTH DAILY Yes Emmanuelle Nye MD   fluticasone (FLONASE) 50 MCG/ACT nasal spray 1 spray by Each Nostril route daily Yes Mathew Rahman MD   famotidine (PEPCID) 20 MG tablet Take 1 tablet by mouth daily Yes Chavez Steele

## 2025-07-10 ENCOUNTER — PATIENT MESSAGE (OUTPATIENT)
Dept: PRIMARY CARE CLINIC | Age: 50
End: 2025-07-10

## 2025-07-11 RX ORDER — SCOPOLAMINE 1 MG/3D
1 PATCH, EXTENDED RELEASE TRANSDERMAL
Qty: 5 PATCH | Refills: 0 | Status: SHIPPED | OUTPATIENT
Start: 2025-07-11

## 2025-07-22 DIAGNOSIS — I10 ESSENTIAL HYPERTENSION: ICD-10-CM

## 2025-07-23 RX ORDER — AMLODIPINE BESYLATE 5 MG/1
5 TABLET ORAL DAILY
Qty: 90 TABLET | Refills: 1 | Status: SHIPPED | OUTPATIENT
Start: 2025-07-23

## 2025-07-23 NOTE — TELEPHONE ENCOUNTER
Medication:   Requested Prescriptions     Pending Prescriptions Disp Refills    amLODIPine (NORVASC) 5 MG tablet [Pharmacy Med Name: amLODIPine Besylate 5 MG Oral Tablet] 120 tablet 2     Sig: TAKE 1 TABLET BY MOUTH DAILY     Last Filled:  4.2.25    Last appt: 5/13/2025   Next appt: 11/17/2025    Last OARRS:        No data to display